# Patient Record
Sex: FEMALE | Race: WHITE | Employment: FULL TIME | URBAN - METROPOLITAN AREA
[De-identification: names, ages, dates, MRNs, and addresses within clinical notes are randomized per-mention and may not be internally consistent; named-entity substitution may affect disease eponyms.]

---

## 2017-05-16 ENCOUNTER — OFFICE VISIT (OUTPATIENT)
Dept: ORTHOPEDICS CLINIC | Facility: CLINIC | Age: 57
End: 2017-05-16

## 2017-05-16 VITALS — RESPIRATION RATE: 14 BRPM | HEART RATE: 66 BPM | SYSTOLIC BLOOD PRESSURE: 118 MMHG | DIASTOLIC BLOOD PRESSURE: 62 MMHG

## 2017-05-16 DIAGNOSIS — M17.0 BILATERAL PRIMARY OSTEOARTHRITIS OF KNEE: Primary | ICD-10-CM

## 2017-05-16 PROCEDURE — 20610 DRAIN/INJ JOINT/BURSA W/O US: CPT | Performed by: ORTHOPAEDIC SURGERY

## 2017-05-16 NOTE — PROCEDURES
After the patient's informed consent was obtained, an informational brochure was given to the patient. The patient's left knee was sterilely prepped and 20 mg Kenalog and 4 cc of 1% lidocaine was given into the knee. Patient tolerated the procedure well.

## 2017-05-16 NOTE — PROGRESS NOTES
Per verbal order from D, draw up 4ml of 1% lidocaine and 2ml of Kenalog 10 for cortisone injection to bilateral knee [facility]

## 2018-01-19 ENCOUNTER — OFFICE VISIT (OUTPATIENT)
Dept: ORTHOPEDICS CLINIC | Facility: CLINIC | Age: 58
End: 2018-01-19

## 2018-01-19 VITALS — DIASTOLIC BLOOD PRESSURE: 78 MMHG | RESPIRATION RATE: 16 BRPM | SYSTOLIC BLOOD PRESSURE: 126 MMHG | HEART RATE: 76 BPM

## 2018-01-19 DIAGNOSIS — M17.0 BILATERAL PRIMARY OSTEOARTHRITIS OF KNEE: Primary | ICD-10-CM

## 2018-01-19 PROCEDURE — 99212 OFFICE O/P EST SF 10 MIN: CPT | Performed by: ORTHOPAEDIC SURGERY

## 2018-01-19 PROCEDURE — 99213 OFFICE O/P EST LOW 20 MIN: CPT | Performed by: ORTHOPAEDIC SURGERY

## 2018-01-19 PROCEDURE — 20610 DRAIN/INJ JOINT/BURSA W/O US: CPT | Performed by: ORTHOPAEDIC SURGERY

## 2018-01-19 NOTE — PROGRESS NOTES
This is a pleasant 28-year-old female with history of bilateral knee osteoarthritis. Patient reports that she recently began to have pain in the bilateral knees. The patient's previous injections were given in May 2017.   The patient comes in today for ev

## 2018-01-19 NOTE — PROGRESS NOTES
Per verbal order from [facility], draw up 4ml of 1% lidocaine and 2ml of Kenalog 10 for cortisone injection to bilateral knee.  Amanda Queen

## 2018-08-24 ENCOUNTER — OFFICE VISIT (OUTPATIENT)
Dept: ORTHOPEDICS CLINIC | Facility: CLINIC | Age: 58
End: 2018-08-24
Payer: COMMERCIAL

## 2018-08-24 VITALS — SYSTOLIC BLOOD PRESSURE: 132 MMHG | DIASTOLIC BLOOD PRESSURE: 84 MMHG | HEART RATE: 92 BPM | RESPIRATION RATE: 16 BRPM

## 2018-08-24 DIAGNOSIS — M17.0 BILATERAL PRIMARY OSTEOARTHRITIS OF KNEE: Primary | ICD-10-CM

## 2018-08-24 PROCEDURE — 99213 OFFICE O/P EST LOW 20 MIN: CPT | Performed by: ORTHOPAEDIC SURGERY

## 2018-08-24 PROCEDURE — 20610 DRAIN/INJ JOINT/BURSA W/O US: CPT | Performed by: ORTHOPAEDIC SURGERY

## 2018-08-24 PROCEDURE — 99212 OFFICE O/P EST SF 10 MIN: CPT | Performed by: ORTHOPAEDIC SURGERY

## 2018-08-24 NOTE — PROGRESS NOTES
Per verbal order from [facility], draw up 4ml of 1% lidocaine and 2ml of Kenalog 10 for cortisone injection to bilateral knee. Amanda Queen    Patient left before post injection vitals were taken.  Amanda Queen

## 2018-08-24 NOTE — PROGRESS NOTES
This is a pleasant 69-year-old female with previous diagnosis of bilateral knee osteoarthritis that is symptomatic. She last had cortisone injections in January 2018. She comes in today for bilateral knee cortisone injections.   Patient reports her pain r

## 2019-01-18 ENCOUNTER — OFFICE VISIT (OUTPATIENT)
Dept: ORTHOPEDICS CLINIC | Facility: CLINIC | Age: 59
End: 2019-01-18
Payer: COMMERCIAL

## 2019-01-18 VITALS — SYSTOLIC BLOOD PRESSURE: 128 MMHG | HEART RATE: 85 BPM | RESPIRATION RATE: 18 BRPM | DIASTOLIC BLOOD PRESSURE: 77 MMHG

## 2019-01-18 DIAGNOSIS — M17.0 BILATERAL PRIMARY OSTEOARTHRITIS OF KNEE: Primary | ICD-10-CM

## 2019-01-18 PROCEDURE — 99212 OFFICE O/P EST SF 10 MIN: CPT | Performed by: ORTHOPAEDIC SURGERY

## 2019-01-18 PROCEDURE — 20610 DRAIN/INJ JOINT/BURSA W/O US: CPT | Performed by: ORTHOPAEDIC SURGERY

## 2019-01-18 PROCEDURE — 99213 OFFICE O/P EST LOW 20 MIN: CPT | Performed by: ORTHOPAEDIC SURGERY

## 2019-01-18 NOTE — PROGRESS NOTES
This is a pleasant 15-year-old female that comes in today for repeat evaluation of the bilateral knees. She has a previous diagnosis of bilateral knee osteoarthritis. She last received cortisone injections in August 2018.   The patient reports her pain re

## 2019-01-18 NOTE — PROGRESS NOTES
Per verbal order from [facility], draw up 4ml of 1% lidocaine and 2ml of Kenalog 10 for cortisone injection to bilateral knee.  Amanda Queen

## 2019-07-12 ENCOUNTER — OFFICE VISIT (OUTPATIENT)
Dept: ORTHOPEDICS CLINIC | Facility: CLINIC | Age: 59
End: 2019-07-12
Payer: COMMERCIAL

## 2019-07-12 VITALS — SYSTOLIC BLOOD PRESSURE: 127 MMHG | DIASTOLIC BLOOD PRESSURE: 78 MMHG | HEART RATE: 78 BPM | RESPIRATION RATE: 18 BRPM

## 2019-07-12 DIAGNOSIS — M17.0 BILATERAL PRIMARY OSTEOARTHRITIS OF KNEE: Primary | ICD-10-CM

## 2019-07-12 PROCEDURE — 20610 DRAIN/INJ JOINT/BURSA W/O US: CPT | Performed by: ORTHOPAEDIC SURGERY

## 2019-07-12 PROCEDURE — 99213 OFFICE O/P EST LOW 20 MIN: CPT | Performed by: ORTHOPAEDIC SURGERY

## 2019-07-12 NOTE — PROGRESS NOTES
Per verbal order from [facility], draw up 4ml of 1% lidocaine and 2ml of Kenalog 10 for cortisone injection to bilateral knee.  Amanda Queen

## 2019-07-12 NOTE — PROCEDURES
After sterile prep, a mixture of 20 mg Kenalog and 4 cc 1% lidocaine was injected into the right and left knee. Patient tolerated each procedure well.

## 2019-07-12 NOTE — PROGRESS NOTES
This is a pleasant 24-year-old female with a previous diagnosis of bilateral knee osteoarthritis that is symptomatically. Patient last came into the office in January 2019 to receive her cortisone injections.   She comes in today for repeat evaluation toda

## 2022-07-27 ENCOUNTER — EKG ENCOUNTER (OUTPATIENT)
Dept: LAB | Facility: HOSPITAL | Age: 62
End: 2022-07-27
Attending: FAMILY MEDICINE
Payer: COMMERCIAL

## 2022-07-27 DIAGNOSIS — M17.9 OSTEOARTHRITIS OF KNEE, UNSPECIFIED: ICD-10-CM

## 2022-07-27 DIAGNOSIS — Z01.818 PRE-OP TESTING: ICD-10-CM

## 2022-07-27 DIAGNOSIS — Z01.818 PREOP EXAMINATION: Primary | ICD-10-CM

## 2022-07-27 PROCEDURE — 93005 ELECTROCARDIOGRAM TRACING: CPT

## 2022-07-27 PROCEDURE — 93010 ELECTROCARDIOGRAM REPORT: CPT | Performed by: FAMILY MEDICINE

## 2022-08-01 ENCOUNTER — LAB ENCOUNTER (OUTPATIENT)
Dept: LAB | Facility: HOSPITAL | Age: 62
End: 2022-08-01
Attending: ORTHOPAEDIC SURGERY
Payer: COMMERCIAL

## 2022-08-01 DIAGNOSIS — Z01.818 PREOP TESTING: ICD-10-CM

## 2022-08-01 LAB
ANTIBODY SCREEN: NEGATIVE
RH BLOOD TYPE: POSITIVE
RH BLOOD TYPE: POSITIVE
SARS-COV-2 RNA RESP QL NAA+PROBE: NOT DETECTED

## 2022-08-01 PROCEDURE — 86900 BLOOD TYPING SEROLOGIC ABO: CPT

## 2022-08-01 PROCEDURE — 36415 COLL VENOUS BLD VENIPUNCTURE: CPT

## 2022-08-01 PROCEDURE — 86850 RBC ANTIBODY SCREEN: CPT

## 2022-08-01 PROCEDURE — 86901 BLOOD TYPING SEROLOGIC RH(D): CPT

## 2022-08-03 ENCOUNTER — ANESTHESIA (OUTPATIENT)
Dept: SURGERY | Facility: HOSPITAL | Age: 62
End: 2022-08-03
Payer: COMMERCIAL

## 2022-08-03 ENCOUNTER — APPOINTMENT (OUTPATIENT)
Dept: GENERAL RADIOLOGY | Facility: HOSPITAL | Age: 62
End: 2022-08-03
Attending: PHYSICIAN ASSISTANT
Payer: COMMERCIAL

## 2022-08-03 ENCOUNTER — HOSPITAL ENCOUNTER (OUTPATIENT)
Facility: HOSPITAL | Age: 62
Setting detail: HOSPITAL OUTPATIENT SURGERY
Discharge: HOME HEALTH | End: 2022-08-03
Attending: ORTHOPAEDIC SURGERY | Admitting: ORTHOPAEDIC SURGERY
Payer: COMMERCIAL

## 2022-08-03 ENCOUNTER — ANESTHESIA EVENT (OUTPATIENT)
Dept: SURGERY | Facility: HOSPITAL | Age: 62
End: 2022-08-03
Payer: COMMERCIAL

## 2022-08-03 VITALS
DIASTOLIC BLOOD PRESSURE: 68 MMHG | HEART RATE: 74 BPM | TEMPERATURE: 98 F | SYSTOLIC BLOOD PRESSURE: 134 MMHG | HEIGHT: 61 IN | RESPIRATION RATE: 19 BRPM | OXYGEN SATURATION: 100 % | BODY MASS INDEX: 23.98 KG/M2 | WEIGHT: 127 LBS

## 2022-08-03 DIAGNOSIS — Z01.818 PREOP TESTING: Primary | ICD-10-CM

## 2022-08-03 PROCEDURE — 88305 TISSUE EXAM BY PATHOLOGIST: CPT | Performed by: ORTHOPAEDIC SURGERY

## 2022-08-03 PROCEDURE — 64447 NJX AA&/STRD FEMORAL NRV IMG: CPT | Performed by: ORTHOPAEDIC SURGERY

## 2022-08-03 PROCEDURE — 0SRD0J9 REPLACEMENT OF LEFT KNEE JOINT WITH SYNTHETIC SUBSTITUTE, CEMENTED, OPEN APPROACH: ICD-10-PCS | Performed by: ORTHOPAEDIC SURGERY

## 2022-08-03 PROCEDURE — 76942 ECHO GUIDE FOR BIOPSY: CPT | Performed by: ORTHOPAEDIC SURGERY

## 2022-08-03 PROCEDURE — 97116 GAIT TRAINING THERAPY: CPT

## 2022-08-03 PROCEDURE — 88311 DECALCIFY TISSUE: CPT | Performed by: ORTHOPAEDIC SURGERY

## 2022-08-03 PROCEDURE — 97161 PT EVAL LOW COMPLEX 20 MIN: CPT

## 2022-08-03 PROCEDURE — 73560 X-RAY EXAM OF KNEE 1 OR 2: CPT | Performed by: PHYSICIAN ASSISTANT

## 2022-08-03 DEVICE — IMPLANTABLE DEVICE
Type: IMPLANTABLE DEVICE | Site: KNEE | Status: FUNCTIONAL
Brand: PERSONA®

## 2022-08-03 DEVICE — IMPLANTABLE DEVICE
Type: IMPLANTABLE DEVICE | Site: KNEE | Status: FUNCTIONAL
Brand: BIOMET® BONE CEMENT R

## 2022-08-03 DEVICE — IMPLSYS 2NDRY FIXATN BIOSWVLK 4.75X19.1
Type: IMPLANTABLE DEVICE | Site: KNEE | Status: FUNCTIONAL
Brand: ARTHREX®

## 2022-08-03 DEVICE — IMPLANTABLE DEVICE
Type: IMPLANTABLE DEVICE | Site: KNEE | Status: FUNCTIONAL
Brand: PERSONA™

## 2022-08-03 DEVICE — IMPLANTABLE DEVICE
Type: IMPLANTABLE DEVICE | Site: KNEE | Status: FUNCTIONAL
Brand: PERSONA® VIVACIT-E®

## 2022-08-03 DEVICE — IMPLANTABLE DEVICE
Type: IMPLANTABLE DEVICE | Site: KNEE | Status: FUNCTIONAL
Brand: PERSONA® NATURAL TIBIA®

## 2022-08-03 RX ORDER — NALOXONE HYDROCHLORIDE 0.4 MG/ML
80 INJECTION, SOLUTION INTRAMUSCULAR; INTRAVENOUS; SUBCUTANEOUS AS NEEDED
Status: DISCONTINUED | OUTPATIENT
Start: 2022-08-03 | End: 2022-08-03

## 2022-08-03 RX ORDER — HYDROMORPHONE HYDROCHLORIDE 1 MG/ML
0.2 INJECTION, SOLUTION INTRAMUSCULAR; INTRAVENOUS; SUBCUTANEOUS EVERY 5 MIN PRN
Status: DISCONTINUED | OUTPATIENT
Start: 2022-08-03 | End: 2022-08-03

## 2022-08-03 RX ORDER — MORPHINE SULFATE 10 MG/ML
6 INJECTION, SOLUTION INTRAMUSCULAR; INTRAVENOUS EVERY 10 MIN PRN
Status: DISCONTINUED | OUTPATIENT
Start: 2022-08-03 | End: 2022-08-03

## 2022-08-03 RX ORDER — SODIUM CHLORIDE, SODIUM LACTATE, POTASSIUM CHLORIDE, CALCIUM CHLORIDE 600; 310; 30; 20 MG/100ML; MG/100ML; MG/100ML; MG/100ML
INJECTION, SOLUTION INTRAVENOUS CONTINUOUS
Status: DISCONTINUED | OUTPATIENT
Start: 2022-08-03 | End: 2022-08-03

## 2022-08-03 RX ORDER — OXYCODONE HYDROCHLORIDE 5 MG/1
5 TABLET ORAL EVERY 6 HOURS PRN
Qty: 28 TABLET | Refills: 0 | Status: SHIPPED | COMMUNITY
Start: 2022-08-03

## 2022-08-03 RX ORDER — ONDANSETRON 2 MG/ML
4 INJECTION INTRAMUSCULAR; INTRAVENOUS EVERY 6 HOURS PRN
Status: CANCELLED | OUTPATIENT
Start: 2022-08-03

## 2022-08-03 RX ORDER — ONDANSETRON 2 MG/ML
4 INJECTION INTRAMUSCULAR; INTRAVENOUS EVERY 6 HOURS PRN
Status: DISCONTINUED | OUTPATIENT
Start: 2022-08-03 | End: 2022-08-03

## 2022-08-03 RX ORDER — CLINDAMYCIN PHOSPHATE 900 MG/50ML
900 INJECTION INTRAVENOUS ONCE
Status: DISCONTINUED | OUTPATIENT
Start: 2022-08-03 | End: 2022-08-03

## 2022-08-03 RX ORDER — BUPIVACAINE HYDROCHLORIDE 7.5 MG/ML
INJECTION, SOLUTION INTRASPINAL
Status: COMPLETED | OUTPATIENT
Start: 2022-08-03 | End: 2022-08-03

## 2022-08-03 RX ORDER — ASPIRIN 81 MG/1
81 TABLET ORAL 2 TIMES DAILY
Qty: 60 TABLET | Refills: 0 | Status: SHIPPED | COMMUNITY
Start: 2022-08-03

## 2022-08-03 RX ORDER — HYDROMORPHONE HYDROCHLORIDE 1 MG/ML
0.6 INJECTION, SOLUTION INTRAMUSCULAR; INTRAVENOUS; SUBCUTANEOUS EVERY 5 MIN PRN
Status: DISCONTINUED | OUTPATIENT
Start: 2022-08-03 | End: 2022-08-03

## 2022-08-03 RX ORDER — DEXAMETHASONE SODIUM PHOSPHATE 4 MG/ML
VIAL (ML) INJECTION AS NEEDED
Status: DISCONTINUED | OUTPATIENT
Start: 2022-08-03 | End: 2022-08-03 | Stop reason: SURG

## 2022-08-03 RX ORDER — HYDROMORPHONE HYDROCHLORIDE 1 MG/ML
0.4 INJECTION, SOLUTION INTRAMUSCULAR; INTRAVENOUS; SUBCUTANEOUS EVERY 5 MIN PRN
Status: DISCONTINUED | OUTPATIENT
Start: 2022-08-03 | End: 2022-08-03

## 2022-08-03 RX ORDER — MORPHINE SULFATE 4 MG/ML
2 INJECTION, SOLUTION INTRAMUSCULAR; INTRAVENOUS EVERY 10 MIN PRN
Status: DISCONTINUED | OUTPATIENT
Start: 2022-08-03 | End: 2022-08-03

## 2022-08-03 RX ORDER — ACETAMINOPHEN 500 MG
1000 TABLET ORAL EVERY 8 HOURS
Qty: 60 TABLET | Refills: 0 | Status: SHIPPED | COMMUNITY
Start: 2022-08-03

## 2022-08-03 RX ORDER — CELECOXIB 200 MG/1
200 CAPSULE ORAL DAILY
Qty: 30 CAPSULE | Refills: 0 | Status: SHIPPED | OUTPATIENT
Start: 2022-08-03

## 2022-08-03 RX ORDER — MIDAZOLAM HYDROCHLORIDE 1 MG/ML
INJECTION INTRAMUSCULAR; INTRAVENOUS
Status: COMPLETED | OUTPATIENT
Start: 2022-08-03 | End: 2022-08-03

## 2022-08-03 RX ORDER — MORPHINE SULFATE 4 MG/ML
4 INJECTION, SOLUTION INTRAMUSCULAR; INTRAVENOUS EVERY 10 MIN PRN
Status: DISCONTINUED | OUTPATIENT
Start: 2022-08-03 | End: 2022-08-03

## 2022-08-03 RX ORDER — ROPIVACAINE HYDROCHLORIDE 5 MG/ML
INJECTION, SOLUTION EPIDURAL; INFILTRATION; PERINEURAL
Status: COMPLETED | OUTPATIENT
Start: 2022-08-03 | End: 2022-08-03

## 2022-08-03 RX ORDER — LIDOCAINE HYDROCHLORIDE 10 MG/ML
INJECTION, SOLUTION INFILTRATION; PERINEURAL
Status: COMPLETED | OUTPATIENT
Start: 2022-08-03 | End: 2022-08-03

## 2022-08-03 RX ORDER — LIDOCAINE HYDROCHLORIDE 10 MG/ML
INJECTION, SOLUTION EPIDURAL; INFILTRATION; INTRACAUDAL; PERINEURAL AS NEEDED
Status: DISCONTINUED | OUTPATIENT
Start: 2022-08-03 | End: 2022-08-03 | Stop reason: SURG

## 2022-08-03 RX ORDER — ONDANSETRON 2 MG/ML
INJECTION INTRAMUSCULAR; INTRAVENOUS AS NEEDED
Status: DISCONTINUED | OUTPATIENT
Start: 2022-08-03 | End: 2022-08-03 | Stop reason: SURG

## 2022-08-03 RX ORDER — PROCHLORPERAZINE EDISYLATE 5 MG/ML
5 INJECTION INTRAMUSCULAR; INTRAVENOUS EVERY 8 HOURS PRN
Status: DISCONTINUED | OUTPATIENT
Start: 2022-08-03 | End: 2022-08-03

## 2022-08-03 RX ORDER — ACETAMINOPHEN 500 MG
1000 TABLET ORAL ONCE
Status: COMPLETED | OUTPATIENT
Start: 2022-08-03 | End: 2022-08-03

## 2022-08-03 RX ORDER — DEXAMETHASONE SODIUM PHOSPHATE 10 MG/ML
INJECTION, SOLUTION INTRAMUSCULAR; INTRAVENOUS
Status: COMPLETED | OUTPATIENT
Start: 2022-08-03 | End: 2022-08-03

## 2022-08-03 RX ORDER — TRANEXAMIC ACID 10 MG/ML
INJECTION, SOLUTION INTRAVENOUS AS NEEDED
Status: DISCONTINUED | OUTPATIENT
Start: 2022-08-03 | End: 2022-08-03 | Stop reason: SURG

## 2022-08-03 RX ADMIN — SODIUM CHLORIDE, SODIUM LACTATE, POTASSIUM CHLORIDE, CALCIUM CHLORIDE: 600; 310; 30; 20 INJECTION, SOLUTION INTRAVENOUS at 09:45:00

## 2022-08-03 RX ADMIN — DEXAMETHASONE SODIUM PHOSPHATE 4 MG: 4 MG/ML VIAL (ML) INJECTION at 07:54:00

## 2022-08-03 RX ADMIN — TRANEXAMIC ACID 1000 MG: 10 INJECTION, SOLUTION INTRAVENOUS at 07:44:00

## 2022-08-03 RX ADMIN — BUPIVACAINE HYDROCHLORIDE 1.5 ML: 7.5 INJECTION, SOLUTION INTRASPINAL at 07:37:00

## 2022-08-03 RX ADMIN — LIDOCAINE HYDROCHLORIDE 50 MG: 10 INJECTION, SOLUTION EPIDURAL; INFILTRATION; INTRACAUDAL; PERINEURAL at 07:44:00

## 2022-08-03 RX ADMIN — LIDOCAINE HYDROCHLORIDE 1 ML: 10 INJECTION, SOLUTION INFILTRATION; PERINEURAL at 07:37:00

## 2022-08-03 RX ADMIN — ONDANSETRON 4 MG: 2 INJECTION INTRAMUSCULAR; INTRAVENOUS at 07:54:00

## 2022-08-03 RX ADMIN — DEXAMETHASONE SODIUM PHOSPHATE 10 MG: 10 INJECTION, SOLUTION INTRAMUSCULAR; INTRAVENOUS at 07:26:00

## 2022-08-03 RX ADMIN — ROPIVACAINE HYDROCHLORIDE 30 ML: 5 INJECTION, SOLUTION EPIDURAL; INFILTRATION; PERINEURAL at 07:26:00

## 2022-08-03 RX ADMIN — TRANEXAMIC ACID 1000 MG: 10 INJECTION, SOLUTION INTRAVENOUS at 09:44:00

## 2022-08-03 RX ADMIN — MIDAZOLAM HYDROCHLORIDE 2 MG: 1 INJECTION INTRAMUSCULAR; INTRAVENOUS at 07:26:00

## 2022-08-03 NOTE — BRIEF OP NOTE
Pre-Operative Diagnosis: primary osteoarthritis of left knee     Post-Operative Diagnosis: primary osteoarthritis of left knee      Procedure Performed:   left total knee arthroplasty    Surgeon(s) and Role:     Dorota Ledesma MD - Primary    Assistant(s):  Surgical Assistant.: Reinaldo Cloud  PA: Abiodun Morales PA-C     Surgical Findings:left knee arthritis     Specimen: left knee bone cuts and tissue     Estimated Blood Loss: Blood Output: 10 mL (8/3/2022  9:50 AM)    Dictation Number:  done    Trent Nagy PA-C  8/3/2022  9:56 AM

## 2022-08-03 NOTE — ANESTHESIA PROCEDURE NOTES
Spinal Block    Date/Time: 8/3/2022 7:37 AM  Performed by: Enriqueta Luz CRNA  Authorized by: Garrett Lawson MD       General Information and Staff    Start Time:  8/3/2022 7:33 AM  End Time:  8/3/2022 7:38 AM  Anesthesiologist:  Garrett Lawson MD  CRNA:  Enriqueta Luz CRNA  Performed by:  CRNA  Preanesthetic Checklist: patient identified, IV checked, risks and benefits discussed, monitors and equipment checked, pre-op evaluation, timeout performed, anesthesia consent and sterile technique used      Procedure Details    Patient Position:  Sitting  Prep: ChloraPrep and patient draped    Monitoring:  Cardiac monitor and continuous pulse ox  Approach:  Midline  Location:  L3-4  Injection Technique:  Single-shot    Needle    Needle Type:  Pencil-tip  Needle Gauge:  24 G  Needle Length:  3.5 in    Assessment    Sensory Level:  T10  Events: clear CSF, CSF aspirated, well tolerated and blood negative      Additional Comments

## 2022-08-03 NOTE — INTERVAL H&P NOTE
Pre-op Diagnosis: primary osteoarthritis of left knee    The above referenced H&P was reviewed by Arianna Parekh MD on 8/3/2022, the patient was examined and no significant changes have occurred in the patient's condition since the H&P was performed. I discussed with the patient and/or legal representative the potential benefits, risks and side effects of this procedure; the likelihood of the patient achieving goals; and potential problems that might occur during recuperation. I discussed reasonable alternatives to the procedure, including risks, benefits and side effects related to the alternatives and risks related to not receiving this procedure. We will proceed with procedure as planned.     Update to H&P: Procedure going to be done at [facility]

## 2022-08-03 NOTE — ANESTHESIA PROCEDURE NOTES
Peripheral Block    Date/Time: 8/3/2022 7:26 AM  Performed by: Janett Culver CRNA  Authorized by: Gerald Han MD       General Information and Staff    Start Time:  8/3/2022 7:18 AM  End Time:  8/3/2022 7:23 AM  CRNA:  Janett Culver CRNA  Performed by:  CRNA  Patient Location:  PACU    Block Placement: Pre Induction  Site Identification: real time ultrasound guided and image stored and retrievable      Reason for Block: at surgeon's request and post-op pain management    Preanesthetic Checklist: 2 patient identifers, IV checked, risks and benefits discussed, monitors and equipment checked, pre-op evaluation, timeout performed, anesthesia consent and sterile technique used      Procedure Details    Patient Position:  Supine  Prep: ChloraPrep    Monitoring:  Cardiac monitor and continuous pulse ox  Block Type:  Saphenous  Injection Technique:  Single-shot    Needle    Needle Type:   Needle Gauge: 22 G  Needle Length:  50 mm  Reason for Ultrasound Use: appropriate spread of the medication was noted in real time and no ultrasound evidence of intravascular and/or intraneural injection            Assessment    Injection Assessment:  Good spread noted, incremental injection, low pressure, local visualized surrounding nerve on ultrasound, negative aspiration for heme and no pain on injection  Paresthesia Pain:  None  Heart Rate Change: No        Medications  8/3/2022 7:26 AM  Midazolam (VERSED)injection 2mg/2ml, 2 mg  fentaNYL (SUBLIMAZE) injection 50mcg/ml, 100 mcg  dexamethasone (DECADRON) PF injection 10 mg/ml, 10 mg  ropivacaine (NAROPIN) injection 0.5%, 30 mL    Additional Comments

## 2022-08-03 NOTE — OPERATIVE REPORT
[facility]    PATIENT'S NAME: Angela Ohara   ATTENDING PHYSICIAN: Mary Ann Gabriel MD   OPERATING PHYSICIAN: Mary Ann Gabriel MD   PATIENT ACCOUNT#:   [de-identified]    LOCATION:  [address]  MEDICAL RECORD #:   [id number]       YOB: 1960  ADMISSION DATE:       08/03/2022      OPERATION DATE:  08/03/2022    OPERATIVE REPORT      PREOPERATIVE DIAGNOSIS:  Left knee osteoarthritis. POSTOPERATIVE DIAGNOSIS:  Left knee osteoarthritis. PROCEDURE:  Left total knee arthroplasty. ASSISTANT:    1.   Julian Barrera PA-C. Please note, Ms. Yazmin Fierro PA-C, was essential to the case for preoperative patient positioning and draping, assistance with soft tissue retraction throughout the case, as well as closure of the wound and placement of the dressing. 2.   WILIAN Birch. IMPLANTS USED:  Nichol Persona size 5 standard femur, size D tibia, 26 mm polyethylene patellar button, and 12 mm constrained polyethylene insert. INDICATIONS:  This is a pleasant 22-year-old female who sustained left knee osteoarthritis. She has undergone a long course of nonoperative intervention which has failed to provide significant relief for her pain. She desired surgery. I had a long discussion with the patient regarding the risks, benefits, and alternatives of surgical and nonsurgical intervention. The risks of surgery included, but were not limited to, infection, bleeding, neurovascular injury, pain and stiffness after surgery, need for surgery in the future, development of deep vein thrombosis or pulmonary emboli, and anesthetic compromise. Patient agreed to proceed with surgical intervention after the above risks were discussed in great detail. OPERATIVE TECHNIQUE:  After the patient's informed consent was obtained, the patient's left knee was marked in the preoperative holding area.   She underwent an adductor canal block and then underwent a spinal anesthetic by the anesthesia service. She was brought back to the operating room and sedated, and then she was given the spinal anesthetic. The left lower extremity was prepped and draped in standard surgical fashion. Perioperative antibiotics were infused. Confirmation of identity and laterality took place. Time-out was performed. A well-padded tourniquet was placed on the left proximal thigh prior to prepping and draping the lower extremity. After a time-out was performed, perioperative antibiotics were infused, confirmation of identity and laterality took place. A midline incision was made over the left knee. Medial parapatellar arthrotomy was performed. There were significant osteophytes present. A medial release was performed. Fat pad was excised. Suprapatellar pouch was excised. There was significant scarring along the medial aspect of the knee due to her prior subtotal meniscectomy surgery remotely. Knee was flexed. Distal femur was cut. The 4-in-1 cutting guide was placed on the distal femur and anterior and posterior cuts were made followed by anterior, posterior, and chamfer cuts. The tibia was subluxated forward, pinned and cut in the appropriate position. Looking at the patellar tendon attachment on the tibial tubercle, some of the patellar attachment medially was released to increase visualization. At this point excess lateral meniscus was excised from the wound. A 10 mm block was placed and the patient had relative stability of varus and valgus stress testing at 0 and 30 degrees; however, due to her significant varus malalignment prior to surgery, constrained polyethylene was selected. At this time the proximal tibia was pinned and cut to accept the final tibia. A trial component was placed. The distal femoral component was placed. The box was cut and 10 mm polyethylene insert was placed. The patient again had relative stability to varus and valgus stress testing.   At this point the wound was thoroughly irrigated. The patella was everted carefully and cut to accept a size 26 mm implant. The posterior, medial, and lateral capsule was then infiltrated with the pain cocktail. The trial components were taken out. The cement was mixed. Final components were placed with the proximal tibial component first followed by the distal femoral component. A 10 mm polyethylene insert was placed. The patella was everted and the 26 mm polyethylene button was cemented into place. At this time excess cement was removed, cement cured, and a 12 mm polyethylene constrained insert was then selected, placed into the knee, and the patient had stability to varus and valgus stress testing, had full range of motion. At this time the wound was thoroughly irrigated and #2 FiberWire was used in a modified Krackow position at the medial aspect of the patellar tendon. This was incorporated to a 4.75 mm SwiveLock anchor distally to reattach this aspect of the patellar tendon to the tibial tubercle. The wound was thoroughly irrigated once more. A #1 Vicryl suture was used to close medial parapatellar arthrotomy. A 2-0 Vicryl suture was used to close skin. Staples were applied to the wound. Dressing was applied over the knee. The patient was awakened from anesthesia successfully and transferred to the recovery room in stable condition. DISPOSITION:  The patient is weightbearing as tolerated on the left lower extremity. She will go home today. She will use OxyContin and Tylenol for pain control. She will use aspirin for DVT prophylaxis. She will follow up in our office in 2 weeks for her first postoperative evaluation.      Dictated By Julee Vaughn MD  d: 08/03/2022 09:48:32  t: 08/03/2022 11:51:22  [facility] [id number]  /

## 2022-08-03 NOTE — CM/SW NOTE
JEANETTE has been notified the pt. Has been pre-arranged with [facility]. Referral sent in A[facility] with face to face and Meri Peacock orders. JEANETTE notified [facility] of discharge plan from same day surgery.      [phone]

## 2024-02-28 RX ORDER — DORZOLAMIDE HYDROCHLORIDE AND TIMOLOL MALEATE 20; 5 MG/ML; MG/ML
1 SOLUTION/ DROPS OPHTHALMIC 2 TIMES DAILY
COMMUNITY

## 2024-02-28 RX ORDER — LEVOTHYROXINE SODIUM 0.07 MG/1
75 TABLET ORAL
COMMUNITY

## 2024-03-01 ENCOUNTER — LAB ENCOUNTER (OUTPATIENT)
Dept: LAB | Facility: HOSPITAL | Age: 64
End: 2024-03-01
Attending: ORTHOPAEDIC SURGERY
Payer: COMMERCIAL

## 2024-03-01 DIAGNOSIS — Z01.818 PREOP TESTING: ICD-10-CM

## 2024-03-01 LAB — MRSA DNA SPEC QL NAA+PROBE: POSITIVE

## 2024-03-01 PROCEDURE — 87641 MR-STAPH DNA AMP PROBE: CPT

## 2024-03-01 NOTE — CM/SW NOTE
3/6 351pm:  [facility] referral, order and face to face have been sent to [facility] via [facility].      Plan: Home with [facility] once [facility] confirms acceptance.   -------------------------  3/1 336pm:  was notified the pt. Has been pre-arranged with [facility].    Referral, orders and face to face to be sent post op.     SHERI Fuller ext [phone]

## 2024-03-04 NOTE — DISCHARGE INSTRUCTIONS
Home Care Instructions Following Your Knee Replacement     We hope you were pleased with your care at [facility]. We wish you the best outcome and overall experience with your operation.   These instructions will help to minimize your pain and improve the likelihood of a successful result.     Weight bearing status  You may place as much weight as tolerated on your operative leg  Use a walker or a cane for assistance with ambulation    Bandages (Dressing)  Keep dressing clean  You can shower as long as your dressing is well sealed beginning 2 days after surgery    Wound Care  The following instructions will promote proper healing and help to prevent infection.  Leave your dressing in place until 7 days after your surgery  On post-operative day 7, you or your home health nurse can remove the bandage and replace it with another water resistant bandage.  Your incision should remain covered and dry until you are seen for your first post operative visit    Cold Therapy  Cold therapy will help minimize swelling, improve, comfort, and limit the need for pain medication.  Apply an ice pack once an hour for 20 minutes as needed  The ice bag should never come in direct contact with the skin. Always place a towel in between the ice pack and your skin  Immediately contact Dr. Bunch's office if you experience excruciating pain not helped by pain medication, burning, blisters, redness, discoloration, or other changes in skin appearance.    Pain Regimen  Take 1000mg of tylenol every 8 hours  Take 1 tablet of Naproxen 500mg twice daily for 30 days  Take 1-2 tablets of Oxycodone 5mg every 6 hours as needed for pain.     Deep Vein Thrombosis (DVT) Prophylaxis  You are at increased risk for developing a DVT or lower extremity blood clot after a lower extremity surgery.   Signs and symptoms of a DVT include calf swelling, calf pain, and calf tenderness to palpation.  If you experience severe calf pain, calf tenderness or  calf swelling, you should go to the nearest urgent care or emergency room immediately for a STAT ultrasound to assess for a blood clot.  Keys to prevention of  DVT are performing ankle pumps (moving your ankle in an up and down motion) multiple times throughout the day for the first 1-2 weeks post-op.  Take 1 tablet of baby aspirin (81mg) twice daily for 4 weeks after your surgery for anticoagulation purposes    Home Medication  Resume your home medications as instructed    Diet  Resume your normal diet.    Activity  Be up and around & working on your PT exercises as you can tolerate.   Try getting up and walking or doing your PT exercises once every hour while awake    Driving  Do not drive if you are taking pain medication. Do not drive until after you are seen in the office.    Follow-up Appointment with Dr. Bunch  You were likely given a postoperative appointment with Dr. Bunch or Sandra STRICKLAND at the time your operation was scheduled.  Call Dr. Bunch's office today to verify your appointment date, time, and location. Call [phone]. You should be seen about 2 weeks after your surgery.  At your 1st postoperative office visit: Your wounds will be evaluated, staples removed, healing assessed and any additional concerns and instructions will be discussed.    Questions or Concerns  Call Dr. Bunch's office if you experience severe pain not controlled by pain medication, swelling, numbness, tingling, bleeding, fever, or other concerns.   If your call is made after office hours, there is always a surgeon covering Dr. Bunch's patients if he is unavailable.    Thank you for coming to [facility] for your operation. The nurses and the anesthesiologist try very hard to make sure you receive the best care possible. Your trust in them is greatly appreciated.    Thanks so much,  Dr. Bunch   HOME INSTRUCTIONS  [facility] HOME CARE INSTRUCTIONS: POST-OP ANESTHESIA  The medication that you received for sedation  or general anesthesia can last up to 24 hours. Your judgment and reflexes may be altered, even if you feel like your normal self.      We Recommend:   Do not drive any motor vehicle or bicycle   Avoid mowing the lawn, playing sports, or working with power tools/applicances (power saws, electric knives or mixers)   That you have someone stay with you on your first night home   Do not drink alcohol or take sleeping pills or tranquilizers   Do not sign legal documents within 24 hours of your procedure   If you had a nerve block for your surgery, take extra care not to put any pressure on your arm or hand for 24 hours    It is normal:  For you to have a sore throat if you had a breathing tube during surgery (while you were asleep!). The sore throat should get better within 48 hours. You can gargle with warm salt water (1/2 tsp in 4 oz warm water) or use a throat lozenge for comfort  To feel muscle aches or soreness especially in the abdomen, chest or neck. The achy feeling should go away in the next 24 hours  To feel weak, sleepy or \"wiped out\". Your should start feeling better in the next 24 hours.   To experience mild discomforts such as sore lip or tongue, headache, cramps, gas pains or a bloated feeling in your abdomen.   To experience mild back pain or soreness for a day or two if you had spinal or epidural anesthesia.   If you had laparoscopic surgery, to feel shoulder pain or discomfort on the day of surgery.   For some patients to have nausea after surgery/anesthesia    If you feel nausea or experience vomiting:   Try to move around less.   Eat less than usual or drink only liquids until the next morning   Nausea should resolve in about 24 hours    If you have a problem when you are at home:    Call your surgeons office   Discharge Instructions: After Your Surgery  You’ve just had surgery. During surgery, you were given medicine called anesthesia to keep you relaxed and free of pain. After surgery, you may have  some pain or nausea. This is common. Here are some tips for feeling better and getting well after surgery.   Going home  Your healthcare provider will show you how to take care of yourself when you go home. They'll also answer your questions. Have an adult family member or friend drive you home. For the first 24 hours after your surgery:   Don't drive or use heavy equipment.  Don't make important decisions or sign legal papers.  Take medicines as directed.  Don't drink alcohol.  Have someone stay with you, if needed. They can watch for problems and help keep you safe.  Be sure to go to all follow-up visits with your healthcare provider. And rest after your surgery for as long as your provider tells you to.   Coping with pain  If you have pain after surgery, pain medicine will help you feel better. Take it as directed, before pain becomes severe. Also, ask your healthcare provider or pharmacist about other ways to control pain. This might be with heat, ice, or relaxation. And follow any other instructions your surgeon or nurse gives you.      Stay on schedule with your medicine.     Tips for taking pain medicine  To get the best relief possible, remember these points:   Pain medicines can upset your stomach. Taking them with a little food may help.  Most pain relievers taken by mouth need at least 20 to 30 minutes to start to work.  Don't wait till your pain becomes severe before you take your medicine. Try to time your medicine so that you can take it before starting an activity. This might be before you get dressed, go for a walk, or sit down for dinner.  Constipation is a common side effect of some pain medicines. Call your healthcare provider before taking any medicines such as laxatives or stool softeners to help ease constipation. Also ask if you should skip any foods. Drinking lots of fluids and eating foods such as fruits and vegetables that are high in fiber can also help. Remember, don't take laxatives  unless your surgeon has prescribed them.  Drinking alcohol and taking pain medicine can cause dizziness and slow your breathing. It can even be deadly. Don't drink alcohol while taking pain medicine.  Pain medicine can make you react more slowly to things. Don't drive or run machinery while taking pain medicine.  Your healthcare provider may tell you to take acetaminophen to help ease your pain. Ask them how much you're supposed to take each day. Acetaminophen or other pain relievers may interact with your prescription medicines or other over-the-counter (OTC) medicines. Some prescription medicines have acetaminophen and other ingredients in them. Using both prescription and OTC acetaminophen for pain can cause you to accidentally overdose. Read the labels on your OTC medicines with care. This will help you to clearly know the list of ingredients, how much to take, and any warnings. It may also help you not take too much acetaminophen. If you have questions or don't understand the information, ask your pharmacist or healthcare provider to explain it to you before you take the OTC medicine.   Managing nausea  Some people have an upset stomach (nausea) after surgery. This is often because of anesthesia, pain, or pain medicine, less movement of food in the stomach, or the stress of surgery. These tips will help you handle nausea and eat healthy foods as you get better. If you were on a special food plan before surgery, ask your healthcare provider if you should follow it while you get better. Check with your provider on how your eating should progress. It may depend on the surgery you had. These general tips may help:   Don't push yourself to eat. Your body will tell you when to eat and how much.  Start off with clear liquids and soup. They're easier to digest.  Next try semi-solid foods as you feel ready. These include mashed potatoes, applesauce, and gelatin.  Slowly move to solid foods. Don’t eat fatty, rich, or  spicy foods at first.  Don't force yourself to have 3 large meals a day. Instead eat smaller amounts more often.  Take pain medicines with a small amount of solid food, such as crackers or toast. This helps prevent nausea.  When to call your healthcare provider  Call your healthcare provider right away if you have any of these:   You still have too much pain, or the pain gets worse, after taking the medicine. The medicine may not be strong enough. Or there may be a complication from the surgery.  You feel too sleepy, dizzy, or groggy. The medicine may be too strong.  Side effects such as nausea or vomiting. Your healthcare provider may advise taking other medicines to .  Skin changes such as rash, itching, or hives. This may mean you have an allergic reaction. Your provider may advise taking other medicines.  The incision looks different (for instance, part of it opens up).  Bleeding or fluid leaking from the incision site, and weren't told to expect that.  Fever of 100.4°F (38°C) or higher, or as directed by your provider.  Call 911  Call 911 right away if you have:   Trouble breathing  Facial swelling    If you have obstructive sleep apnea   You were given anesthesia medicine during surgery to keep you comfortable and free of pain. After surgery, you may have more apnea spells because of this medicine and other medicines you were given. The spells may last longer than normal.    At home:  Keep using the continuous positive airway pressure (CPAP) device when you sleep. Unless your healthcare provider tells you not to, use it when you sleep, day or night. CPAP is a common device used to treat obstructive sleep apnea.  Talk with your provider before taking any pain medicine, muscle relaxants, or sedatives. Your provider will tell you about the possible dangers of taking these medicines.  Contact your provider if your sleeping changes a lot even when taking medicines as directed.  Jose last reviewed this  educational content on 10/1/2021  © 0112-2676 [employer]. All rights reserved. This information is not intended as a substitute for professional medical care. Always follow your healthcare professional's instructions.

## 2024-03-06 ENCOUNTER — HOSPITAL ENCOUNTER (OUTPATIENT)
Facility: HOSPITAL | Age: 64
Discharge: HOME HEALTH | End: 2024-03-07
Attending: ORTHOPAEDIC SURGERY | Admitting: ORTHOPAEDIC SURGERY
Payer: COMMERCIAL

## 2024-03-06 ENCOUNTER — ANESTHESIA (OUTPATIENT)
Dept: SURGERY | Facility: HOSPITAL | Age: 64
End: 2024-03-06
Payer: COMMERCIAL

## 2024-03-06 ENCOUNTER — ANESTHESIA EVENT (OUTPATIENT)
Dept: SURGERY | Facility: HOSPITAL | Age: 64
End: 2024-03-06
Payer: COMMERCIAL

## 2024-03-06 ENCOUNTER — APPOINTMENT (OUTPATIENT)
Dept: GENERAL RADIOLOGY | Facility: HOSPITAL | Age: 64
End: 2024-03-06
Attending: PHYSICIAN ASSISTANT
Payer: COMMERCIAL

## 2024-03-06 DIAGNOSIS — M17.11 PRIMARY OSTEOARTHRITIS OF RIGHT KNEE: ICD-10-CM

## 2024-03-06 DIAGNOSIS — Z01.818 PREOP TESTING: Primary | ICD-10-CM

## 2024-03-06 PROCEDURE — 88311 DECALCIFY TISSUE: CPT | Performed by: ORTHOPAEDIC SURGERY

## 2024-03-06 PROCEDURE — 97116 GAIT TRAINING THERAPY: CPT

## 2024-03-06 PROCEDURE — 73560 X-RAY EXAM OF KNEE 1 OR 2: CPT | Performed by: PHYSICIAN ASSISTANT

## 2024-03-06 PROCEDURE — 88305 TISSUE EXAM BY PATHOLOGIST: CPT | Performed by: ORTHOPAEDIC SURGERY

## 2024-03-06 PROCEDURE — 0SRC0J9 REPLACEMENT OF RIGHT KNEE JOINT WITH SYNTHETIC SUBSTITUTE, CEMENTED, OPEN APPROACH: ICD-10-PCS | Performed by: ORTHOPAEDIC SURGERY

## 2024-03-06 PROCEDURE — 64447 NJX AA&/STRD FEMORAL NRV IMG: CPT | Performed by: ORTHOPAEDIC SURGERY

## 2024-03-06 PROCEDURE — 97530 THERAPEUTIC ACTIVITIES: CPT

## 2024-03-06 PROCEDURE — 97161 PT EVAL LOW COMPLEX 20 MIN: CPT

## 2024-03-06 DEVICE — IMPLANTABLE DEVICE
Type: IMPLANTABLE DEVICE | Site: KNEE | Status: FUNCTIONAL
Brand: PERSONA® VIVACIT-E®

## 2024-03-06 DEVICE — IMPLANTABLE DEVICE
Type: IMPLANTABLE DEVICE | Site: KNEE | Status: FUNCTIONAL
Brand: PERSONA®

## 2024-03-06 DEVICE — IMPLANTABLE DEVICE
Type: IMPLANTABLE DEVICE | Site: KNEE | Status: FUNCTIONAL
Brand: BIOMET® BONE CEMENT R

## 2024-03-06 DEVICE — IMPLANTABLE DEVICE
Type: IMPLANTABLE DEVICE | Site: KNEE | Status: FUNCTIONAL
Brand: PERSONA® NATURAL TIBIA®

## 2024-03-06 RX ORDER — POLYETHYLENE GLYCOL 3350 17 G/17G
17 POWDER, FOR SOLUTION ORAL DAILY PRN
Status: DISCONTINUED | OUTPATIENT
Start: 2024-03-06 | End: 2024-03-06

## 2024-03-06 RX ORDER — DEXAMETHASONE SODIUM PHOSPHATE 10 MG/ML
INJECTION, SOLUTION INTRAMUSCULAR; INTRAVENOUS
Status: COMPLETED | OUTPATIENT
Start: 2024-03-06 | End: 2024-03-06

## 2024-03-06 RX ORDER — DOCUSATE SODIUM 100 MG/1
100 CAPSULE, LIQUID FILLED ORAL 2 TIMES DAILY
Status: DISCONTINUED | OUTPATIENT
Start: 2024-03-06 | End: 2024-03-07

## 2024-03-06 RX ORDER — ONDANSETRON 2 MG/ML
4 INJECTION INTRAMUSCULAR; INTRAVENOUS EVERY 6 HOURS PRN
Status: DISCONTINUED | OUTPATIENT
Start: 2024-03-06 | End: 2024-03-06 | Stop reason: HOSPADM

## 2024-03-06 RX ORDER — ASPIRIN 81 MG/1
81 TABLET ORAL 2 TIMES DAILY
Status: DISCONTINUED | OUTPATIENT
Start: 2024-03-06 | End: 2024-03-07

## 2024-03-06 RX ORDER — HYDROMORPHONE HYDROCHLORIDE 1 MG/ML
0.6 INJECTION, SOLUTION INTRAMUSCULAR; INTRAVENOUS; SUBCUTANEOUS EVERY 5 MIN PRN
Status: DISCONTINUED | OUTPATIENT
Start: 2024-03-06 | End: 2024-03-06 | Stop reason: HOSPADM

## 2024-03-06 RX ORDER — MELATONIN
3 NIGHTLY PRN
Status: DISCONTINUED | OUTPATIENT
Start: 2024-03-06 | End: 2024-03-07

## 2024-03-06 RX ORDER — SODIUM CHLORIDE 9 MG/ML
INJECTION, SOLUTION INTRAVENOUS CONTINUOUS
Status: DISCONTINUED | OUTPATIENT
Start: 2024-03-06 | End: 2024-03-07

## 2024-03-06 RX ORDER — LIDOCAINE HYDROCHLORIDE 10 MG/ML
INJECTION, SOLUTION EPIDURAL; INFILTRATION; INTRACAUDAL; PERINEURAL AS NEEDED
Status: DISCONTINUED | OUTPATIENT
Start: 2024-03-06 | End: 2024-03-06 | Stop reason: SURG

## 2024-03-06 RX ORDER — SODIUM CHLORIDE, SODIUM LACTATE, POTASSIUM CHLORIDE, CALCIUM CHLORIDE 600; 310; 30; 20 MG/100ML; MG/100ML; MG/100ML; MG/100ML
INJECTION, SOLUTION INTRAVENOUS CONTINUOUS
Status: DISCONTINUED | OUTPATIENT
Start: 2024-03-06 | End: 2024-03-07

## 2024-03-06 RX ORDER — MORPHINE SULFATE 4 MG/ML
2 INJECTION, SOLUTION INTRAMUSCULAR; INTRAVENOUS EVERY 10 MIN PRN
Status: DISCONTINUED | OUTPATIENT
Start: 2024-03-06 | End: 2024-03-06 | Stop reason: HOSPADM

## 2024-03-06 RX ORDER — METOCLOPRAMIDE HYDROCHLORIDE 5 MG/ML
10 INJECTION INTRAMUSCULAR; INTRAVENOUS EVERY 8 HOURS PRN
Status: DISCONTINUED | OUTPATIENT
Start: 2024-03-06 | End: 2024-03-07

## 2024-03-06 RX ORDER — MORPHINE SULFATE 10 MG/ML
6 INJECTION, SOLUTION INTRAMUSCULAR; INTRAVENOUS EVERY 10 MIN PRN
Status: DISCONTINUED | OUTPATIENT
Start: 2024-03-06 | End: 2024-03-06 | Stop reason: HOSPADM

## 2024-03-06 RX ORDER — HYDROMORPHONE HYDROCHLORIDE 1 MG/ML
0.4 INJECTION, SOLUTION INTRAMUSCULAR; INTRAVENOUS; SUBCUTANEOUS EVERY 2 HOUR PRN
Status: DISCONTINUED | OUTPATIENT
Start: 2024-03-06 | End: 2024-03-07

## 2024-03-06 RX ORDER — BISACODYL 10 MG
10 SUPPOSITORY, RECTAL RECTAL
Status: DISCONTINUED | OUTPATIENT
Start: 2024-03-06 | End: 2024-03-06

## 2024-03-06 RX ORDER — HYDROMORPHONE HYDROCHLORIDE 1 MG/ML
0.8 INJECTION, SOLUTION INTRAMUSCULAR; INTRAVENOUS; SUBCUTANEOUS EVERY 2 HOUR PRN
Status: DISCONTINUED | OUTPATIENT
Start: 2024-03-06 | End: 2024-03-07

## 2024-03-06 RX ORDER — SENNOSIDES 8.6 MG
17.2 TABLET ORAL NIGHTLY
Status: DISCONTINUED | OUTPATIENT
Start: 2024-03-06 | End: 2024-03-07

## 2024-03-06 RX ORDER — ONDANSETRON 2 MG/ML
4 INJECTION INTRAMUSCULAR; INTRAVENOUS EVERY 6 HOURS PRN
Status: CANCELLED | OUTPATIENT
Start: 2024-03-06

## 2024-03-06 RX ORDER — BISACODYL 10 MG
10 SUPPOSITORY, RECTAL RECTAL
Status: DISCONTINUED | OUTPATIENT
Start: 2024-03-06 | End: 2024-03-07

## 2024-03-06 RX ORDER — SENNOSIDES 8.6 MG
17.2 TABLET ORAL NIGHTLY PRN
Status: DISCONTINUED | OUTPATIENT
Start: 2024-03-06 | End: 2024-03-07

## 2024-03-06 RX ORDER — OXYCODONE HYDROCHLORIDE 5 MG/1
10 TABLET ORAL EVERY 4 HOURS PRN
Status: DISCONTINUED | OUTPATIENT
Start: 2024-03-06 | End: 2024-03-07

## 2024-03-06 RX ORDER — NAPROXEN 500 MG/1
500 TABLET ORAL 2 TIMES DAILY WITH MEALS
Status: DISCONTINUED | OUTPATIENT
Start: 2024-03-06 | End: 2024-03-07

## 2024-03-06 RX ORDER — TRANEXAMIC ACID 650 MG/1
1300 TABLET ORAL ONCE
Status: COMPLETED | OUTPATIENT
Start: 2024-03-06 | End: 2024-03-06

## 2024-03-06 RX ORDER — ACETAMINOPHEN 500 MG
1000 TABLET ORAL ONCE
Status: COMPLETED | OUTPATIENT
Start: 2024-03-06 | End: 2024-03-06

## 2024-03-06 RX ORDER — ACETAMINOPHEN 500 MG
1000 TABLET ORAL EVERY 8 HOURS
Status: DISCONTINUED | OUTPATIENT
Start: 2024-03-06 | End: 2024-03-07

## 2024-03-06 RX ORDER — METOCLOPRAMIDE HYDROCHLORIDE 5 MG/ML
10 INJECTION INTRAMUSCULAR; INTRAVENOUS EVERY 8 HOURS PRN
Status: DISCONTINUED | OUTPATIENT
Start: 2024-03-06 | End: 2024-03-06 | Stop reason: HOSPADM

## 2024-03-06 RX ORDER — ONDANSETRON 2 MG/ML
4 INJECTION INTRAMUSCULAR; INTRAVENOUS EVERY 6 HOURS PRN
Status: DISCONTINUED | OUTPATIENT
Start: 2024-03-06 | End: 2024-03-06

## 2024-03-06 RX ORDER — NALOXONE HYDROCHLORIDE 0.4 MG/ML
80 INJECTION, SOLUTION INTRAMUSCULAR; INTRAVENOUS; SUBCUTANEOUS AS NEEDED
Status: DISCONTINUED | OUTPATIENT
Start: 2024-03-06 | End: 2024-03-06 | Stop reason: HOSPADM

## 2024-03-06 RX ORDER — CEFAZOLIN SODIUM/WATER 2 G/20 ML
SYRINGE (ML) INTRAVENOUS AS NEEDED
Status: DISCONTINUED | OUTPATIENT
Start: 2024-03-06 | End: 2024-03-06 | Stop reason: SURG

## 2024-03-06 RX ORDER — ASPIRIN 81 MG/1
81 TABLET ORAL 2 TIMES DAILY
Status: SHIPPED | COMMUNITY
Start: 2024-03-06

## 2024-03-06 RX ORDER — BUPIVACAINE HYDROCHLORIDE 7.5 MG/ML
INJECTION, SOLUTION INTRASPINAL
Status: COMPLETED | OUTPATIENT
Start: 2024-03-06 | End: 2024-03-06

## 2024-03-06 RX ORDER — ACETAMINOPHEN 500 MG
1000 TABLET ORAL
Status: DISCONTINUED | OUTPATIENT
Start: 2024-03-06 | End: 2024-03-06

## 2024-03-06 RX ORDER — OXYCODONE HYDROCHLORIDE 5 MG/1
5 TABLET ORAL EVERY 6 HOURS PRN
Status: DISCONTINUED | OUTPATIENT
Start: 2024-03-06 | End: 2024-03-06

## 2024-03-06 RX ORDER — DIPHENHYDRAMINE HYDROCHLORIDE 50 MG/ML
25 INJECTION INTRAMUSCULAR; INTRAVENOUS ONCE AS NEEDED
Status: ACTIVE | OUTPATIENT
Start: 2024-03-06 | End: 2024-03-06

## 2024-03-06 RX ORDER — MIDAZOLAM HYDROCHLORIDE 1 MG/ML
INJECTION INTRAMUSCULAR; INTRAVENOUS
Status: COMPLETED | OUTPATIENT
Start: 2024-03-06 | End: 2024-03-06

## 2024-03-06 RX ORDER — POLYETHYLENE GLYCOL 3350 17 G/17G
17 POWDER, FOR SOLUTION ORAL DAILY PRN
Status: DISCONTINUED | OUTPATIENT
Start: 2024-03-06 | End: 2024-03-07

## 2024-03-06 RX ORDER — DIPHENHYDRAMINE HCL 25 MG
25 CAPSULE ORAL EVERY 4 HOURS PRN
Status: DISCONTINUED | OUTPATIENT
Start: 2024-03-06 | End: 2024-03-07

## 2024-03-06 RX ORDER — ROPIVACAINE HYDROCHLORIDE 5 MG/ML
INJECTION, SOLUTION EPIDURAL; INFILTRATION; PERINEURAL
Status: COMPLETED | OUTPATIENT
Start: 2024-03-06 | End: 2024-03-06

## 2024-03-06 RX ORDER — ACETAMINOPHEN 500 MG
1000 TABLET ORAL EVERY 8 HOURS
Status: SHIPPED | COMMUNITY
Start: 2024-03-06

## 2024-03-06 RX ORDER — ONDANSETRON 2 MG/ML
4 INJECTION INTRAMUSCULAR; INTRAVENOUS EVERY 6 HOURS PRN
Status: DISCONTINUED | OUTPATIENT
Start: 2024-03-06 | End: 2024-03-07

## 2024-03-06 RX ORDER — CEFAZOLIN SODIUM/WATER 2 G/20 ML
2 SYRINGE (ML) INTRAVENOUS ONCE
Status: DISCONTINUED | OUTPATIENT
Start: 2024-03-06 | End: 2024-03-06 | Stop reason: HOSPADM

## 2024-03-06 RX ORDER — MORPHINE SULFATE 4 MG/ML
4 INJECTION, SOLUTION INTRAMUSCULAR; INTRAVENOUS EVERY 10 MIN PRN
Status: DISCONTINUED | OUTPATIENT
Start: 2024-03-06 | End: 2024-03-06 | Stop reason: HOSPADM

## 2024-03-06 RX ORDER — DIPHENHYDRAMINE HYDROCHLORIDE 50 MG/ML
12.5 INJECTION INTRAMUSCULAR; INTRAVENOUS EVERY 4 HOURS PRN
Status: DISCONTINUED | OUTPATIENT
Start: 2024-03-06 | End: 2024-03-07

## 2024-03-06 RX ORDER — ENEMA 19; 7 G/133ML; G/133ML
1 ENEMA RECTAL ONCE AS NEEDED
Status: DISCONTINUED | OUTPATIENT
Start: 2024-03-06 | End: 2024-03-07

## 2024-03-06 RX ORDER — ASPIRIN 81 MG/1
81 TABLET ORAL 2 TIMES DAILY
Status: DISCONTINUED | OUTPATIENT
Start: 2024-03-06 | End: 2024-03-06

## 2024-03-06 RX ORDER — OXYCODONE HYDROCHLORIDE 5 MG/1
5 TABLET ORAL EVERY 4 HOURS PRN
Status: DISCONTINUED | OUTPATIENT
Start: 2024-03-06 | End: 2024-03-07

## 2024-03-06 RX ORDER — HYDROMORPHONE HYDROCHLORIDE 1 MG/ML
0.4 INJECTION, SOLUTION INTRAMUSCULAR; INTRAVENOUS; SUBCUTANEOUS EVERY 5 MIN PRN
Status: DISCONTINUED | OUTPATIENT
Start: 2024-03-06 | End: 2024-03-06 | Stop reason: HOSPADM

## 2024-03-06 RX ORDER — ENEMA 19; 7 G/133ML; G/133ML
1 ENEMA RECTAL ONCE AS NEEDED
Status: DISCONTINUED | OUTPATIENT
Start: 2024-03-06 | End: 2024-03-06

## 2024-03-06 RX ORDER — OXYCODONE HYDROCHLORIDE 5 MG/1
5 TABLET ORAL EVERY 6 HOURS PRN
Status: SHIPPED | COMMUNITY
Start: 2024-03-06

## 2024-03-06 RX ORDER — HYDROMORPHONE HYDROCHLORIDE 1 MG/ML
0.2 INJECTION, SOLUTION INTRAMUSCULAR; INTRAVENOUS; SUBCUTANEOUS EVERY 5 MIN PRN
Status: DISCONTINUED | OUTPATIENT
Start: 2024-03-06 | End: 2024-03-06 | Stop reason: HOSPADM

## 2024-03-06 RX ORDER — NAPROXEN 500 MG/1
500 TABLET ORAL 2 TIMES DAILY WITH MEALS
Refills: 0 | Status: SHIPPED | COMMUNITY
Start: 2024-03-06

## 2024-03-06 RX ADMIN — ROPIVACAINE HYDROCHLORIDE 30 ML: 5 INJECTION, SOLUTION EPIDURAL; INFILTRATION; PERINEURAL at 10:53:00

## 2024-03-06 RX ADMIN — DEXAMETHASONE SODIUM PHOSPHATE 10 MG: 10 INJECTION, SOLUTION INTRAMUSCULAR; INTRAVENOUS at 10:53:00

## 2024-03-06 RX ADMIN — MIDAZOLAM HYDROCHLORIDE 2 MG: 1 INJECTION INTRAMUSCULAR; INTRAVENOUS at 10:53:00

## 2024-03-06 RX ADMIN — CEFAZOLIN SODIUM/WATER 2 G: 2 G/20 ML SYRINGE (ML) INTRAVENOUS at 12:00:00

## 2024-03-06 RX ADMIN — LIDOCAINE HYDROCHLORIDE 50 MG: 10 INJECTION, SOLUTION EPIDURAL; INFILTRATION; INTRACAUDAL; PERINEURAL at 11:47:00

## 2024-03-06 RX ADMIN — BUPIVACAINE HYDROCHLORIDE 1 ML: 7.5 INJECTION, SOLUTION INTRASPINAL at 11:46:00

## 2024-03-06 RX ADMIN — SODIUM CHLORIDE, SODIUM LACTATE, POTASSIUM CHLORIDE, CALCIUM CHLORIDE: 600; 310; 30; 20 INJECTION, SOLUTION INTRAVENOUS at 11:43:00

## 2024-03-06 RX ADMIN — SODIUM CHLORIDE, SODIUM LACTATE, POTASSIUM CHLORIDE, CALCIUM CHLORIDE: 600; 310; 30; 20 INJECTION, SOLUTION INTRAVENOUS at 12:55:00

## 2024-03-06 NOTE — ANESTHESIA PROCEDURE NOTES
Spinal Block    Date/Time: 3/6/2024 11:46 AM    Performed by: Elías Echols MD  Authorized by: Elías Echols MD      General Information and Staff    Start Time:  3/6/2024 11:46 AM  End Time:  3/6/2024 11:47 AM  Anesthesiologist:  Elías Echols MD  Performed by:  Anesthesiologist  Patient Location:  OR  Site identification: surface landmarks    Reason for Block: at surgeon's request and surgical anesthesia    Preanesthetic Checklist: patient identified, IV checked, risks and benefits discussed, monitors and equipment checked, pre-op evaluation, timeout performed, anesthesia consent and sterile technique used      Procedure Details    Patient Position:  Sitting  Prep: ChloraPrep    Monitoring:  Cardiac monitor  Approach:  Midline  Location:  L4-5  Injection Technique:  Single-shot    Needle    Needle Type:  Pencil-tip  Needle Gauge:  24 G  Needle Length:  3.5 in    Assessment    Sensory Level:  T10  Events: clear CSF, CSF aspirated, well tolerated and blood negative      Additional Comments

## 2024-03-06 NOTE — OPERATIVE REPORT
[facility]    PATIENT'S NAME: AVERY BOLDEN   ATTENDING PHYSICIAN: Forest Bunch MD   OPERATING PHYSICIAN: Forest Bunch MD   PATIENT ACCOUNT#:   [id number]    LOCATION:  [facility] PACU 1 [facility]  MEDICAL RECORD #:   [id number]       YOB: 1960  ADMISSION DATE:       03/06/2024      OPERATION DATE:  03/06/2024    OPERATIVE REPORT      PREOPERATIVE DIAGNOSIS:  Right knee osteoarthritis.  POSTOPERATIVE DIAGNOSIS:  Right knee osteoarthritis.  PROCEDURE:  Right total knee arthroplasty.      ASSISTANTS:    1.   Sandra Hall PA-C   2.   Laurel Bates RSA     IMPLANTS USED:  Nichol Persona size 6 femur, size D tibia, 26 mm polyethylene patella button, and 12 mm constrained polyethylene insert.     INDICATIONS:  This is a pleasant 63-year-old female who sustained right knee osteoarthritis that was symptomatic.  The patient failed nonoperative intervention, and she desired surgery in the form of right total knee arthroplasty.  I had a long discussion with the patient regarding the risks, benefits, and alternatives of surgical and nonsurgical intervention.  The risks of surgery included, but were not limited to, infection, bleeding, neurovascular injury, pain and stiffness after surgery, need for surgery in the future, development of deep vein thrombosis or pulmonary emboli, and anesthetic compromise.  The patient agreed to proceed with surgical intervention after the above risks were discussed in great detail.    OPERATIVE TECHNIQUE:  After the patient's informed consent was obtained, the patient's right knee was marked in the preoperative holding area.  She was given an adductor canal block by the anesthesia service.  She was given a spinal anesthetic and sedated.  The right lower extremity was prepped and draped in standard surgical fashion.  Perioperative antibiotics were infused.  Confirmation of identity and laterality took place.  Time-out was performed.    Tourniquet was  insufflated to 300 mmHg.  A midline incision was made over the anterior aspect of the right knee.  Medial parapatellar arthrotomy was performed.  Medial release was performed.  The suprapatellar pouch was excised.  The knee was flexed.  The anterior horn of the lateral meniscus was sectioned.  The distal femoral cutting guide was placed.  It was pinned and cut in the appropriate position.  The 4-in-1 cutting guide was placed.  The anterior and posterior cuts were made, followed by the anterior and posterior chamfer cuts.  At this time, the tibia was subluxated forward and pinned and cut in the appropriate position.  Remnant medial and lateral menisci were excised from the wound.  A 10 mm block was then placed and was stable in flexion and extension.  The proximal tibia was sized to a size D.  The trial femoral component was placed.  The box was cut.  The size D tibia and a 12 mm polyethylene insert were placed.  The patient had full extension, full flexion, and was stable to varus and valgus stress testing.  At this time, the patella was everted and pinned and cut to accept a 26 mm implant.  The proximal tibia was then cut to accept the final implant.  The wound was thoroughly irrigated.  The pain cocktail was placed in the posteromedial and lateral capsule in order to aid in postoperative pain control.  The cement was mixed.  The implants were placed.  The proximal tibial implant was placed first, followed by the distal femoral component.  A 12 mm trial polyethylene insert was placed.  The patella was placed.  The knee was brought out into full extension.  Cement was allowed to cure.  Once cement was allowed to cure, a 12 mm polyethylene constrained implant was placed and found to be stable.  Therefore, excess cement was taken out of the wound, the wound was thoroughly irrigated once more, and the final 12 mm constrained polyethylene implant was placed.  The wound was irrigated once more and then closed in layers.   Medial parapatellar arthrotomy was closed with #1 Vicryl.  Subcutaneous tissue was closed with 2-0 Vicryl suture.  Skin was closed with staples.  Dressing was applied to the anterior aspect of the right knee.  The patient was awakened from anesthesia and transferred to the recovery room in stable condition.    DISPOSITION:  The patient will go home today.  She will perform home physical therapy for 2 weeks, followed by outpatient physical therapy for 4 weeks.  She will take OxyContin and Tylenol for pain control, as well as aspirin for DVT prophylaxis.  She will call our office with any questions or concerns.      Please note that Ms. Hall was essential to the case for preoperative patient positioning, draping, assistance with soft tissue retraction, closure of the wound, and placement of the dressing.    Dictated By Forest Bunch MD  d: 03/06/2024 13:16:49  t: 03/06/2024 13:36:56  Job [id number]  GD/

## 2024-03-06 NOTE — PHYSICAL THERAPY NOTE
PHYSICAL THERAPY KNEE EVALUATION - INPATIENT      Room Number: [facility]  Evaluation Date: 3/6/2024  Type of Evaluation: Initial  Physician Order: PT Eval and Treat    Presenting Problem: s/p R TKA on 3/6     Reason for Therapy: Mobility Dysfunction and Discharge Planning    PHYSICAL THERAPY ASSESSMENT   Patient is a 63 year old female admitted 3/6/2024 for R TKA.  Prior to admission, patient's baseline is independent with ADLs and functional mobility without assistive device.  Patient is currently functioning below baseline with bed mobility, transfers, gait, stair negotiation, standing prolonged periods, and performing household tasks.  Patient is requiring stand-by assist and contact guard assist as a result of the following impairments: decreased functional strength, pain, impaired standing balance, decreased muscular endurance, limited R knee ROM, and impaired sensation .  Physical Therapy will continue to follow for duration of hospitalization.    Patient will benefit from continued skilled PT Services at discharge to promote functional independence and safety with additional support and return home with home health PT.    PLAN  PT Treatment Plan: Bed mobility;Body mechanics;Endurance;Energy conservation;Patient education;Family education;Gait training;Strengthening;Stair training;Transfer training;Balance training  Rehab Potential : Good  Frequency (Obs): BID    PHYSICAL THERAPY MEDICAL/SOCIAL HISTORY     Problem List  Active Problems:    Primary osteoarthritis of right knee    Preop testing      HOME SITUATION  Home Situation  Type of Home: House  Home Layout: Two level (lives downstairs)  Stairs to Enter : 5  Railing: Yes  Lives With: Alone (family lives upstairs)  Drives: Yes  Patient Owned Equipment: Rolling walker  Patient Regularly Uses: Glasses     Prior Level of [place]: independent     SUBJECTIVE  Agreeable to activity.    PHYSICAL THERAPY EXAMINATION   OBJECTIVE  Precautions: None  Fall  Risk: Standard fall risk    WEIGHT BEARING RESTRICTION  Weight Bearing Restriction: R lower extremity  R Lower Extremity: Weight Bearing as Tolerated    PAIN ASSESSMENT  Rating:  (did not rate)  Location: right knee  Management Techniques: Body mechanics;Activity promotion;Repositioning    COGNITION  Overall Cognitive Status:  WFL - within functional limits    RANGE OF MOTION AND STRENGTH ASSESSMENT  Upper extremity ROM and strength are within functional limits.  Lower extremity ROM is within functional limits.  Lower extremity strength is within functional limits.    BALANCE  Static Sitting: Good  Dynamic Sitting: Fair +  Static Standing: Fair  Dynamic Standing: Fair -    AM-PAC '6-Clicks' INPATIENT SHORT FORM - BASIC MOBILITY  How much difficulty does the patient currently have...  Patient Difficulty: Turning over in bed (including adjusting bedclothes, sheets and blankets)?: A Little   Patient Difficulty: Sitting down on and standing up from a chair with arms (e.g., wheelchair, bedside commode, etc.): A Little   Patient Difficulty: Moving from lying on back to sitting on the side of the bed?: A Little   How much help from another person does the patient currently need...   Help from Another: Moving to and from a bed to a chair (including a wheelchair)?: A Little   Help from Another: Need to walk in hospital room?: A Little   Help from Another: Climbing 3-5 steps with a railing?: A Little     AM-PAC Score:  Raw Score: 18   Approx Degree of Impairment: 46.58%   Standardized Score (AM-PAC Scale): 43.63   CMS Modifier (G-Code): CK     FUNCTIONAL ABILITY STATUS  Functional Mobility/Gait Assessment  Gait Assistance: Contact guard assist  Distance (ft): 200  Assistive Device: Rolling walker  Pattern: Shuffle;R Decreased stance time (R decreased weight bearing, flexed posture, guarded, heavy BUE reliance on RW, step-to pattern, slow and frequent pauses while walking due to fatigue and pain. no LOB)  Supine to Sit:  stand-by assist  Sit to Supine: stand-by assist  Sit to Stand: contact guard assist    Exercise/Education Provided:  Bed mobility  Body mechanics  Energy conservation  Functional activity tolerated  Gait training  Posture  Lower therapeutic exercise:  Ankle pumps  Heel slides  LAQ  Quad sets  Sitting knee flexion  Transfer training    Pt received resting in bed. Introduced self and role. Educated on WBAT, TKA mobility protocol and exercises, role of PT, goals for this session. Pt verbalized understanding. C/o minimal pain of R knee at rest, worse with movement. Main complaint this date is impaired sensation and numbness in R foot. Pt demos bed mobility with SBA. STS transfer with RW and CGA. Able to weight shift laterally and march in place with no knee buckling noted. Able to take side steps along EOB with RW, no LOB. Therefore attempted hallway ambulation. Pt was able to ambulate community distance with RW and CGA, however with difficulty bearing weight and advancing RLE, significant BUE reliance on RW to do so. No LOB but unsteady and unsafe to progress gait to SBA at this time. Pt demos toilet transfer with CGA. Approached the stairs to attempt, however unable to do so due to numbness and pain. Returned to room, left pt sitting at EOB with family present, needs within reach. Discussed recommendation to stay overnight for additional safey and therapy session tomorrow morning prior to DC. Pt agreeable. RN was made aware of the above.     The patient's Approx Degree of Impairment: 46.58% has been calculated based on documentation in the [facility] '6 clicks' Inpatient Basic Mobility Short Form.  Research supports that patients with this level of impairment may benefit from home with HH PT.  Final disposition will be made by interdisciplinary medical team.    Patient End of Session: In bed;Needs met;Call light within reach;RN aware of session/findings;All patient questions and concerns addressed;Family present    CURRENT  GOALS  Goals to be met by: 3/10/24  Patient Goal Patient's self-stated goal is: go home   Goal #1 Patient is able to demonstrate supine - sit EOB @ level: modified independent     Goal #1   Current Status    Goal #2 Patient is able to demonstrate transfers Sit to/from Stand at assistance level: modified independent     Goal #2  Current Status    Goal #3 Patient is able to ambulate 150 feet with assistive device at assistance level: supervision   Goal #3   Current Status    Goal #4 Patient will negotiate 4 stairs/one curb w/ assistive device and supervision   Goal #4   Current Status    Goal #5  AROM 0 degrees extension to 95 degrees flexion     Goal #5   Current Status    Goal #6 Patient independently performs home exercise program for ROM/strengthening per the instructions provided in preparation for discharge.   Goal #6  Current Status      Patient Evaluation Complexity Level:  History Low - no personal factors and/or co-morbidities   Examination of body systems Low -  addressing 1-2 elements   Clinical Presentation Low- Stable   Clinical Decision Making  Low Complexity     Gait Training: 10 minutes  Therapeutic Activity:  15 minutes

## 2024-03-06 NOTE — ANESTHESIA PREPROCEDURE EVALUATION
Anesthesia PreOp Note    HPI:     Monse Ro is a 63 year old female who presents for preoperative consultation requested by: Forest Bunch MD    Date of Surgery: 3/6/2024    Procedure(s):  Right total knee arthroplasty  Indication: Primary osteoarthritis of right knee    Relevant Problems   No relevant active problems       NPO:  Last Liquid Consumption Date: 24  Last Liquid Consumption Time:   Last Solid Consumption Date: 24  Last Solid Consumption Time:   Last Liquid Consumption Date: 24          History Review:  Patient Active Problem List    Diagnosis Date Noted    Osteoarthrosis, unspecified whether generalized or localized, lower leg 2015       Past Medical History:   Diagnosis Date    Disorder of thyroid     Glaucoma     Visual impairment     glasses       Past Surgical History:   Procedure Laterality Date    ARTHROSCOPY OF JOINT UNLISTED Bilateral           EYE SURGERY Left     HYSTERECTOMY      TOTAL KNEE REPLACEMENT Left        Medications Prior to Admission   Medication Sig Dispense Refill Last Dose    mupirocin 2% Nasal Ointment 0.9 g (1 Application total) by Each Nare route 2 (two) times daily. 10 doses for 5 days   3/5/2024 at 2100    dorzolamide-timolol 2-0.5 % Ophthalmic Solution Place 1 drop into both eyes 2 (two) times daily.   3/5/2024 at 1900    levothyroxine 75 MCG Oral Tab Take 1 tablet (75 mcg total) by mouth before breakfast.   3/6/2024 at 0700     Current Facility-Administered Medications Ordered in Epic   Medication Dose Route Frequency Provider Last Rate Last Admin    lactated ringers infusion   Intravenous Continuous Forest Bunch MD 20 mL/hr at 24 0956 New Bag at 24 0956    ceFAZolin (Ancef) 2 g in 20mL IV syringe premix  2 g Intravenous Once Forest Bunch MD        And    vancomycin (Vancocin) 1,000 mg in sodium chloride 0.9% 250 mL IVPB-ADDV  15 mg/kg Intravenous Once Forest Bunch  mL/hr at  24 1007 1,000 mg at 24 1007    clonidine-EPINEPHrine-ropivacaine (CERs) (Duraclon-Adrenalin-Naropin) pain cocktain irrigation (for NSAId allergic patients)   Intra-articular Once (Intra-Op) Forest Bunch MD         No current [facility]-ordered outpatient medications on file.       Allergies   Allergen Reactions    Penicillin V Tightness in Chest    Penicillins DIZZINESS       Family History   Problem Relation Age of Onset    Diabetes Paternal Grandmother     Diabetes Sister     Diabetes Father      Social History     Socioeconomic History    Marital status:    Tobacco Use    Smoking status: Former     Types: Cigarettes     Quit date: 2010     Years since quittin.4    Smokeless tobacco: Never   Vaping Use    Vaping Use: Never used   Substance and Sexual Activity    Alcohol use: Not Currently    Drug use: No   Other Topics Concern    Caffeine Concern Yes     Comment: coffee       Available pre-op labs reviewed.             Vital Signs:  Body mass index is 24.56 kg/m².   height is 1.549 m (5' 1\") and weight is 59 kg (130 lb). Her oral temperature is 97.8 °F (36.6 °C). Her blood pressure is 146/76 and her pulse is 85. Her respiration is 16 and oxygen saturation is 100%.   Vitals:    24 1704 24 0935   BP:  146/76   Pulse:  85   Resp:  16   Temp:  97.8 °F (36.6 °C)   TempSrc:  Oral   SpO2:  100%   Weight: 59 kg (130 lb) 59 kg (130 lb)   Height: 1.549 m (5' 1\") 1.549 m (5' 1\")        Anesthesia Evaluation     Patient summary reviewed and Nursing notes reviewed    No history of anesthetic complications   Airway   Mallampati: II  TM distance: >3 FB  Neck ROM: full  Dental      Pulmonary - negative ROS    breath sounds clear to auscultation  (-) COPD, asthma, sleep apnea  Cardiovascular - negative ROS  Exercise tolerance: good  (-) hypertension, CAD, CHF    Rhythm: regular  Rate: normal    Neuro/Psych - negative ROS   (-) CVA    GI/Hepatic/Renal - negative ROS   (-) GERD, liver  disease, renal disease    Endo/Other    (+) hypothyroidism, arthritis  (-) diabetes mellitus    Comments: H/o L TKA under spinal  Abdominal                  Anesthesia Plan:   ASA:  2  Plan:   Spinal  Post-op Pain Management: Oral pain medication, IV analgesics and Saphenous block  Informed Consent Plan and Risks Discussed With:  Patient  Consent Comment: Risks of spinal including infection, hematoma, nerve damage and risks of GA including N/V, sore throat, respiratory/cardiac compromise explained to pt and pt voiced understanding; all questions answered.          I have informed Monse Ro and/or legal guardian or family member of the nature of the anesthetic plan, benefits, risks including possible dental damage if relevant, major complications, and any alternative forms of anesthetic management.   All of the patient's questions were answered to the best of my ability. The patient desires the anesthetic management as planned.  Elías Echols MD  3/6/2024 10:25 AM  Present on Admission:  **None**

## 2024-03-06 NOTE — H&P
History and Physical     Monse Ro Patient Status:  Hospital Outpatient Surgery    10/24/1960 MRN [id number]   Location [facility] POST ANESTHESIA CARE UNIT Attending Forest Bunch MD   Hosp Day # 0 PCP AMNA SIMPSON     Chief Complaint: right knee primary osteoarthritis    Subjective:    Monse Ro is a 63 year old female with right knee OA. Pt has failed non-op tx and is indicated to undergo R TKA.     History/Other:      Past Medical History:  Past Medical History:   Diagnosis Date    Disorder of thyroid     Glaucoma     Visual impairment     glasses        Past Surgical History:   Past Surgical History:   Procedure Laterality Date    ARTHROSCOPY OF JOINT UNLISTED Bilateral           EYE SURGERY Left     HYSTERECTOMY      TOTAL KNEE REPLACEMENT Left        Social History:  reports that she quit smoking about 13 years ago. Her smoking use included cigarettes. She has never used smokeless tobacco. She reports that she does not currently use alcohol. She reports that she does not use drugs.    Family History:   Family History   Problem Relation Age of Onset    Diabetes Paternal Grandmother     Diabetes Sister     Diabetes Father        Allergies:   Allergies   Allergen Reactions    Penicillin V Tightness in Chest    Penicillins DIZZINESS       Medications:    No current facility-administered medications on file prior to encounter.     Current Outpatient Medications on File Prior to Encounter   Medication Sig Dispense Refill    mupirocin 2% Nasal Ointment 0.9 g (1 Application total) by Each Nare route 2 (two) times daily. 10 doses for 5 days      dorzolamide-timolol 2-0.5 % Ophthalmic Solution Place 1 drop into both eyes 2 (two) times daily.      levothyroxine 75 MCG Oral Tab Take 1 tablet (75 mcg total) by mouth before breakfast.         Review of Systems:   A comprehensive 14 point review of systems was completed.    Pertinent positives and negatives noted in the  HPI.    Objective:     /74   Pulse 73   Temp 97.8 °F (36.6 °C) (Oral)   Resp 17   Ht 5' 1\" (1.549 m)   Wt 130 lb (59 kg)   SpO2 96%   BMI 24.56 kg/m²   General: No acute distress.  Alert ,         HEENT: Normocephalic atraumatic. Moist mucous membranes. EOM-I. PERRLA. Anicteric.  Neck: No lymphadenopathy. No JVD. No carotid bruits.  Respiratory: Clear to auscultation bilaterally. No wheezes. No rhonchi.    Cardiovascular: S1, S2. Regular rate and rhythm. No murmurs, rubs or gallops. Equal pulses.   Chest and Back: No tenderness or deformity.  Abdomen: Soft, nontender, nondistended.  Positive bowel sounds. No rebound, guarding or organomegaly.  Neurologic: No focal neurological deficits. CNII-XII grossly intact.  Musculoskeletal: Moves all extremities.  Extremities: No edema or cyanosis.  Psychiatric: Appropriate mood and affect.  Integument: No rashes or lesions.   R LE:  TTP over MJL, LJL  -Woo's  Stable to varus and valgus stress        Assessment & Plan:    63F that sustained right knee OA.  To OR for R TKA   -Home today  -ASA for DVT prophylaxis  Tylenol/Oxy for pain      The risks, benefits, and alternatives of surgical versus non-surgical intervention were discussed in detail and include but are not limited to infection, bleeding, neurovascular injury, need for further surgery, development of deep vein thrombosis, pulmonary emboli, and anesthesia problems. The patient understands the risks and wishes to proceed with surgery.           Forest Bunch MD  3/6/2024

## 2024-03-06 NOTE — ANESTHESIA PROCEDURE NOTES
Peripheral Block    Date/Time: 3/6/2024 10:53 AM    Performed by: Elías Echols MD  Authorized by: Elías Echols MD      General Information and Staff    Start Time:  3/6/2024 10:53 AM  End Time:  3/6/2024 10:56 AM  Anesthesiologist:  Elías Echols MD  Performed by:  Anesthesiologist  Patient Location:  PACU    Block Placement: Pre Induction  Site Identification: real time ultrasound guided and image stored and retrievable      Reason for Block: at surgeon's request and post-op pain management    Preanesthetic Checklist: 2 patient identifers, IV checked, risks and benefits discussed, monitors and equipment checked, pre-op evaluation, timeout performed, anesthesia consent and sterile technique used      Procedure Details    Patient Position:  Supine  Prep: ChloraPrep    Monitoring:  Cardiac monitor  Block Type:  Saphenous  Laterality:  Right  Injection Technique:  Single-shot    Needle    Needle Type:  Short-bevel  Needle Gauge:  22 G  Needle Length:  100 mm  Needle Localization:  Ultrasound guidance  Reason for Ultrasound Use: appropriate spread of the medication was noted in real time and no ultrasound evidence of intravascular and/or intraneural injection            Assessment    Injection Assessment:  Good spread noted, incremental injection, low pressure, local visualized surrounding nerve on ultrasound, negative aspiration for heme and no pain on injection  Paresthesia Pain:  None  Heart Rate Change: No        Medications  3/6/2024 10:53 AM  midazolam (VERSED)injection 2mg/2ml - Intravenous   2 mg - 3/6/2024 10:53:00 AM  ropivacaine (NAROPIN) injection 0.5% - Infiltration   30 mL - 3/6/2024 10:53:00 AM  dexamethasone (DECADRON) PF injection 10 mg/ml - Injection   10 mg - 3/6/2024 10:53:00 AM    Additional Comments

## 2024-03-06 NOTE — ANESTHESIA POSTPROCEDURE EVALUATION
Patient: Monse Ro    Procedure Summary       Date: 03/06/24 Room / Location: [facility] MAIN OR 02 / [facility] MAIN OR    Anesthesia Start: 1143 Anesthesia Stop: 1333    Procedure: Right total knee arthroplasty (Right: Knee) Diagnosis: (Primary osteoarthritis of right knee)    Surgeons: Forest Bunch MD Anesthesiologist: Elías Echols MD    Anesthesia Type: spinal ASA Status: 2            Anesthesia Type: spinal    Vitals Value Taken Time   /87 03/06/24 1411   Temp 97.8 °F (36.6 °C) 03/06/24 1330   Pulse 84 03/06/24 1414   Resp 13 03/06/24 1414   SpO2 99 % 03/06/24 1414   Vitals shown include unfiled device data.    [facility] AN Post Evaluation:   Patient Evaluated in PACU  Patient Participation: waiting for patient participation  Level of Consciousness: sleepy but conscious  Pain Score: 0  Pain Management: adequate  Airway Patency:patent  Yes    Cardiovascular Status: acceptable and hemodynamically stable  Respiratory Status: acceptable, spontaneous ventilation and nonlabored ventilation  Postoperative Hydration euvolemic      Elías Echols MD  3/6/2024 2:14 PM

## 2024-03-07 VITALS
BODY MASS INDEX: 24.55 KG/M2 | DIASTOLIC BLOOD PRESSURE: 65 MMHG | RESPIRATION RATE: 18 BRPM | TEMPERATURE: 98 F | OXYGEN SATURATION: 99 % | HEART RATE: 96 BPM | SYSTOLIC BLOOD PRESSURE: 133 MMHG | WEIGHT: 130 LBS | HEIGHT: 61 IN

## 2024-03-07 LAB
HCT VFR BLD AUTO: 36.4 %
HGB BLD-MCNC: 11.7 G/DL

## 2024-03-07 PROCEDURE — 97535 SELF CARE MNGMENT TRAINING: CPT

## 2024-03-07 PROCEDURE — 85014 HEMATOCRIT: CPT | Performed by: PHYSICIAN ASSISTANT

## 2024-03-07 PROCEDURE — 97116 GAIT TRAINING THERAPY: CPT

## 2024-03-07 PROCEDURE — 97165 OT EVAL LOW COMPLEX 30 MIN: CPT

## 2024-03-07 PROCEDURE — 85018 HEMOGLOBIN: CPT | Performed by: PHYSICIAN ASSISTANT

## 2024-03-07 NOTE — PROGRESS NOTES
[facility] Patient Status:  Outpatient in a Bed    10/24/1960 MRN [id number]   Location [facility] Attending Forest Bunch MD   Hosp Day # 0 PCP AMNA SIMPSON     Subjective:  Post-Operative Day: 1 Status Post right Total Knee Arthroplasty  Systemic or Specific Complaints:No Complaints      Objective:  Vital signs in last 24 hours:  Temp:  [97.7 °F (36.5 °C)-98.2 °F (36.8 °C)] 97.7 °F (36.5 °C)  Pulse:  [] 96  Resp:  [11-25] 18  BP: (106-154)/(65-89) 133/65  SpO2:  [92 %-100 %] 99 %    General: alert, appears stated age, and cooperative   Wound: Wound clean and dry no evidence of infection.   Motion: Extension: Full Extension   DVT Exam: No evidence of DVT seen on physical exam.     Data Review  CBC:   Lab Results   Component Value Date    HGB 11.7 (L) 2024    HCT 36.4 2024       Assessment/Plan:  Status Post right Total Knee Arthroplasty. Doing well postoperatively.     Discharge today, Return to Clinic: 2 weeks  Home PT  Asa for DVT prophylaxis  Tylenol/Oxy for pain control     LOS: 0 days

## 2024-03-07 NOTE — H&P
[facility] Hospitalist H&P       Post operative medical management     PCP: AMNA SIMPSON    History of Present Illness:   63-year-old female who is here for right total knee arthroplasty.  Procedure was elective and scheduled for today.  Procedure went well however afterwards patient did not fully do well with physical therapy and plan is for more physical therapy tomorrow.  Plan is for patient to stay overnight.  She denies any other complaints and is currently doing okay.  Vital signs are stable.    Review of Systems  Comprehensive ROS reviewed and negative except for what's stated above.     PMH  Past Medical History:   Diagnosis Date    Disorder of thyroid     Glaucoma     Visual impairment     glasses      PSH  Past Surgical History:   Procedure Laterality Date    ARTHROSCOPY OF JOINT UNLISTED Bilateral           EYE SURGERY Left     HYSTERECTOMY      TOTAL KNEE REPLACEMENT Left       ALL:  Allergies   Allergen Reactions    Penicillin V Tightness in Chest    Penicillins DIZZINESS    Celebrex [Celecoxib] RASH      Home Medications:  Outpatient Medications Marked as Taking for the 3/6/24 encounter (Hospital Encounter)   Medication Sig Dispense Refill    mupirocin 2% Nasal Ointment 0.9 g (1 Application total) by Each Nare route 2 (two) times daily. 10 doses for 5 days      oxyCODONE 5 MG Oral Tab Take 1 tablet (5 mg total) by mouth every 6 (six) hours as needed for Pain.      acetaminophen 500 MG Oral Tab Take 2 tablets (1,000 mg total) by mouth every 8 (eight) hours.      aspirin 81 MG Oral Tab EC Take 1 tablet (81 mg total) by mouth in the morning and 1 tablet (81 mg total) before bedtime.      naproxen 500 MG Oral Tab Take 1 tablet (500 mg total) by mouth 2 (two) times daily with meals.  0    dorzolamide-timolol 2-0.5 % Ophthalmic Solution Place 1 drop into both eyes 2 (two) times daily.      levothyroxine 75 MCG Oral Tab Take 1 tablet (75 mcg total) by mouth before breakfast.        Soc Hx  Social History     Tobacco Use    Smoking status: Former     Types: Cigarettes     Quit date: 2010     Years since quittin.4    Smokeless tobacco: Never   Substance Use Topics    Alcohol use: Not Currently      Fam Hx  Family History   Problem Relation Age of Onset    Diabetes Paternal Grandmother     Diabetes Sister     Diabetes Father      OBJECTIVE:  /85 (BP Location: Right arm)   Pulse 88   Temp 98.2 °F (36.8 °C) (Temporal)   Resp 16   Ht 5' 1\" (1.549 m)   Wt 130 lb (59 kg)   SpO2 98%   BMI 24.56 kg/m²   General: Alert, no acute distress  HEENT: atraumatic, sclera anicteric, oral mucosa normal   Neck: non tender, no adenopathy   Lungs: clear to ausculation bilaterally, no wheezing  Heart: Regular rate and rhythm  Abdomen: soft, non tender, non distended   Extremities: No edema  Skin: no new rash, normal color  Neuro: CN II-XII intact, 5/5 strength in bilateral extremities, normal sensation in face and extremities  Psych: appropriate affect     Radiology: XR KNEE (1 OR 2 VIEWS), RIGHT (CPT=73560)    Result Date: 3/6/2024  CONCLUSION:  1. Total right knee arthroplasty.    Dictated by (CST): José Luis Cline MD on 3/06/2024 at 3:35 PM     Finalized by (CST): José Luis Cline MD on 3/06/2024 at 3:37 PM           ASSESSMENT / PLAN:   63-year-old female who is here for right total knee arthroplasty.     Right total knee arthroplasty  -will remain in hospital and be observed overnight  -repeat therapy tomorrow  -ordered her aspirin 81mg BID  -tylenol + oxycodone  -ok to hold her home meds, she states she'll take her synthroid at home  -incentive spirometer if able  -no need for AM labs     Hypothyroidism  -can hold her synthroid     Disposition: home tomorrow     Arjun Houston [facility] Hospitalist

## 2024-03-07 NOTE — OCCUPATIONAL THERAPY NOTE
OCCUPATIONAL THERAPY EVALUATION - INPATIENT     Room Number: Room 7/Room 7-A  Evaluation Date: 3/7/2024  Type of Evaluation: Initial  Presenting Problem: RT TKA    Physician Order: IP Consult to Occupational Therapy  Reason for Therapy: ADL/IADL Dysfunction and Discharge Planning    OCCUPATIONAL THERAPY ASSESSMENT   Patient is a 63 year old female admitted 3/6/2024 for RT TKA.  Prior to admission, patient's baseline is indepenednt without AD.  Patient is currently functioning near baseline with ADLs and functional mobility. Further skilled OT services are not indicated. Pt plans to return home this morning with support from her family as needed.     PLAN  Patient has been evaluated and presents with no skilled Occupational Therapy needs  at this time.  Patient will be discharged from Occupational Therapy services. Please re-order if a new functional limitation presents during this admission.     OCCUPATIONAL THERAPY MEDICAL/SOCIAL HISTORY   Problem List  Active Problems:    Primary osteoarthritis of right knee    Preop testing    HOME SITUATION  Type of Home: House  Home Layout: Multi-level (Pt resides in lower level with 6 stairs to access)  Lives With: -- (pt's mother and 2 sisters reside in the same home on the upper levels)  Toilet and Equipment: Standard height toilet  Shower/Tub and Equipment: Walk-in shower  Occupation/Status: manager  Drives: Yes  Patient Regularly Uses: Glasses    Stairs in Home: 6 to access her living space  Use of Assistive Device(s): none    Prior Level of [place]: I without AD for all I/ADLs and functional mobility including driving and working.     SUBJECTIVE  \"I was shocked when I couldn't go home last night\"    OCCUPATIONAL THERAPY EXAMINATION    OBJECTIVE  Precautions: None  Fall Risk: Standard fall risk    WEIGHT BEARING RESTRICTION  R Lower Extremity: Weight Bearing as Tolerated    PAIN ASSESSMENT  Rating: -- (minimal)  Location: RT knee  Management Techniques: Relaxation;  Repositioning    ACTIVITIES OF DAILY LIVING ASSESSMENT  -PAC ‘6-Clicks’ Inpatient Daily Activity Short Form  How much help from another person does the patient currently need…  -   Putting on and taking off regular lower body clothing?: None  -   Bathing (including washing, rinsing, drying)?: None  -   Toileting, which includes using toilet, bedpan or urinal? : None  -   Putting on and taking off regular upper body clothing?: None  -   Taking care of personal grooming such as brushing teeth?: None  -   Eating meals?: None    AM-PAC Score:  Score: 24  Approx Degree of Impairment: 0%  Standardized Score (-PAC Scale): 57.54  CMS Modifier (G-Code): CH    FUNCTIONAL TRANSFER ASSESSMENT  Sit to Stand: Edge of Bed; Chair  Edge of Bed: Modified Independent  Chair: Modified Independent  Toilet Transfer: Modified Independent  Simulated Car Transfer: Supervision  BR mobility: supervision/Mod I using R/W    FUNCTIONAL ADL ASSESSMENT  Pt demo the ability to perform BADLs including LE dressing, toileting, standing for grooming modified independent.     EDUCATION PROVIDED  Patient: Role of Occupational Therapy; Plan of Care; Discharge Recommendations; DME Recommendations; Functional Transfer Techniques; Weight Bear Status; Surgical Precautions; Posture/Positioning; Proper Body Mechanics  Patient's Response to Education: Verbalized Understanding; Returned Demonstration    The patient's Approx Degree of Impairment: 0% has been calculated based on documentation in the [facility] '6 clicks' Inpatient Daily Activity Short Form.  Research supports that patients with this level of impairment have no further skilled OT needs..  Final disposition will be made by interdisciplinary medical team.    Patient End of Session: Up in chair;Call light within reach;Needs met;All patient questions and concerns addressed    Patient Evaluation Complexity Level:   Occupational Profile/Medical History LOW - Brief history including review of medical or  therapy records    Specific performance deficits impacting engagement in ADL/IADL LOW  1 - 3 performance deficits    Client Assessment/Performance Deficits LOW - No comorbidities nor modifications of tasks    Clinical Decision Making LOW - Analysis of occupational profile, problem-focused assessments, limited treatment options    Overall Complexity LOW     Self-Care Home Management: 15 minutes

## 2024-03-07 NOTE — DISCHARGE SUMMARY
General Medicine Discharge Summary     Patient ID:  Monse Ro  63 year old  10/24/1960    Admit date: 3/6/2024    Discharge date and time: 3/7/2024 10:41 AM     Attending Physician: Arjun Lugo DO    Primary Care Physician: AMNA SIMPSON     Discharge Diagnoses:   Primary osteoarthritis of right knee    Discharge Condition: stable    Disposition: home     Important Follow up:  Forest Bunch MD  [address] [address]  [phone]    Schedule an appointment as soon as possible for a visit in 1 day(s)    Hospital Course:      63-year-old female who is here for right total knee arthroplasty.      Right total knee arthroplasty  -remained in hospital and observed overnight  -aspirin 81mg BID  -tylenol + oxycodone PRN at discharge. Also some nsaids  -stable to dc to home today, with appropriate orthopedic surgery follow up     Consults:   IP CONSULT TO CASE MANAGEMENT  IP CONSULT TO SOCIAL WORK    Operative Procedures:   Procedure(s) (LRB):  Right total knee arthroplasty (Right)       Patient instructions:      Discharge Medication List as of 3/7/2024  9:36 AM        CONTINUE these medications which have NOT CHANGED    Details   oxyCODONE 5 MG Oral Tab Take 1 tablet (5 mg total) by mouth every 6 (six) hours as needed for Pain., Historical      acetaminophen 500 MG Oral Tab Take 2 tablets (1,000 mg total) by mouth every 8 (eight) hours., Historical      aspirin 81 MG Oral Tab EC Take 1 tablet (81 mg total) by mouth in the morning and 1 tablet (81 mg total) before bedtime., Historical      naproxen 500 MG Oral Tab Take 1 tablet (500 mg total) by mouth 2 (two) times daily with meals., Historical, R-0      dorzolamide-timolol 2-0.5 % Ophthalmic Solution Place 1 drop into both eyes 2 (two) times daily., Historical      levothyroxine 75 MCG Oral Tab Take 1 tablet (75 mcg total) by mouth before breakfast., Historical           STOP  taking these medications       mupirocin 2% Nasal Ointment            FULL CODE    Exam on day of discharge:     Vitals:    03/07/24 0332   BP: 133/65   Pulse:    Resp: 18   Temp: 97.7 °F (36.5 °C)       General: no acute distress  Heart: RRR  Lungs: clear bilaterally  Abdomen: nontender  Extremities: no pedal edema     Total time coordinating care for discharge: Greater than 30 minutes    Arjun Lugo DO

## 2024-03-07 NOTE — PHYSICAL THERAPY NOTE
PHYSICAL THERAPY KNEE TREATMENT NOTE - INPATIENT     Room Number: Room 7/Room 7-A             Presenting Problem: s/p R TKA on 3/6       Problem List  Active Problems:    Primary osteoarthritis of right knee    Preop testing      PHYSICAL THERAPY ASSESSMENT   Patient demonstrates good  progress this session, goals  remain in progress.    Patient continues to function below baseline with bed mobility, transfers, gait, and standing prolonged periods.  Contributing factors to remaining limitations include decreased functional strength, pain, impaired standing balance, and decreased muscular endurance.  Next session anticipate patient to progress transfers, gait, and standing prolonged periods.  Physical Therapy will continue to follow patient for duration of hospitalization.    Patient continues to benefit from continued skilled PT services: at discharge to promote functional independence in home.  Anticipate patient will return home with home health PT.    PLAN  PT Treatment Plan: Bed mobility;Endurance;Patient education;Gait training;Strengthening;Stair training;Transfer training;Balance training  Frequency (Obs): Daily    SUBJECTIVE  I want to get out of here.    OBJECTIVE  Precautions: None    WEIGHT BEARING STATUS        R Lower Extremity: Weight Bearing as Tolerated       PAIN ASSESSMENT   Rating:  (did not rate, stated, \"it itches\")  Location: R knee  Management Techniques: Activity promotion;Repositioning    BALANCE  Static Sitting: Good  Dynamic Sitting: Good  Static Standing: Fair  Dynamic Standing: Fair -    ACTIVITY TOLERANCE                         O2 WALK       AM-PAC '6-Clicks' INPATIENT SHORT FORM - BASIC MOBILITY  How much difficulty does the patient currently have...  Patient Difficulty: Turning over in bed (including adjusting bedclothes, sheets and blankets)?: A Little   Patient Difficulty: Sitting down on and standing up from a chair with arms (e.g., wheelchair, bedside commode, etc.): A Little    Patient Difficulty: Moving from lying on back to sitting on the side of the bed?: A Little   How much help from another person does the patient currently need...   Help from Another: Moving to and from a bed to a chair (including a wheelchair)?: A Little   Help from Another: Need to walk in hospital room?: A Little   Help from Another: Climbing 3-5 steps with a railing?: A Little     AM-PAC Score:  Raw Score: 18   Approx Degree of Impairment: 46.58%   Standardized Score (AM-PAC Scale): 43.63   CMS Modifier (G-Code): CK    FUNCTIONAL ABILITY STATUS  Functional Mobility/Gait Assessment  Gait Assistance: Supervision  Distance (ft): 100, 50  Assistive Device: Rolling walker  Pattern: R Decreased stance time  Stairs: Stairs  How Many Stairs: 4  Device: 1 Rail  Assist: Contact guard assist  Pattern: Ascend and Descend  Ascend and Descend : Step to  Rolling:  NT  Supine to Sit:  NT  Sit to Supine:  NT  Sit to Stand: supervision and stand-by assist    Additional Information: Pt ok to be seen per YANA Saleem. Pt highly motivated to return home today. Pt demo'd safety with transfers and gait. Pt limited by altered sensation on surgical LE, both to light touch and deep pressure when compared with LLE. Pt is not completely numb, but her sensation is not typical. Pt is more confident in her ability to perform functional mobility today and her pain in her RLE is significantly improved since yesterday. Pt educ on stair pattern, able to perform 4 stairs up/down with CGA progressing to standby assist. Pt educ in RW placement and hand placement to initiate and complete stairs. Pt declined review of therex and stated, \"I've done them all before.\" Pt has handout with pictures and instructions of therex in her bag for home. Discussed activity pacing for home with pt. Pt safe to perform mobility required at home.    The patient's Approx Degree of Impairment: 46.58% has been calculated based on documentation in the [facility] '6 clicks'  Inpatient Basic Mobility Short Form.  Research supports that patients with this level of impairment may benefit from home with HHPT.  Final disposition will be made by interdisciplinary medical team.        Knee ROM   R Knee Flexion (degrees): 85 (approx)             Patient End of Session: Up in chair;Needs met;Call light within reach;RN aware of session/findings;All patient questions and concerns addressed    CURRENT GOALS  Goals to be met by: 3/10/24  Patient Goal Patient's self-stated goal is: go home   Goal #1 Patient is able to demonstrate supine - sit EOB @ level: modified independent      Goal #1   Current Status  NT pt received up in chair   Goal #2 Patient is able to demonstrate transfers Sit to/from Stand at assistance level: modified independent      Goal #2  Current Status  supervision with RW for safety   Goal #3 Patient is able to ambulate 150 feet with assistive device at assistance level: supervision   Goal #3   Current Status  x100 and x50 with RW and standby to supv.   Goal #4 Patient will negotiate 4 stairs/one curb w/ assistive device and supervision   Goal #4   Current Status  up/down stairs with 1 rail and CGA to supv   Goal #5  AROM 0 degrees extension to 95 degrees flexion     Goal #5   Current Status  in progress   Goal #6 Patient independently performs home exercise program for ROM/strengthening per the instructions provided in preparation for discharge.   Goal #6  Current Status  in progress     Gait Training: 15 minutes

## (undated) DEVICE — ENCORE® LATEX MICRO SIZE 8.5, STERILE LATEX POWDER-FREE SURGICAL GLOVE: Brand: ENCORE

## (undated) DEVICE — BOWL CEMENT MIX QUICK-VAC

## (undated) DEVICE — Device: Brand: STABLECUT®

## (undated) DEVICE — PAD THRP 16IN WRPON MU LNG STM

## (undated) DEVICE — PACK CDS TOTAL KNEE

## (undated) DEVICE — ENCORE® LATEX ACCLAIM SIZE 8.5, STERILE LATEX POWDER-FREE SURGICAL GLOVE: Brand: ENCORE

## (undated) DEVICE — NEEDLE HPO 18GA 1.5IN ECLPS

## (undated) DEVICE — GAMMEX® PI HYBRID SIZE 8.5, STERILE POWDER-FREE SURGICAL GLOVE, POLYISOPRENE AND NEOPRENE BLEND: Brand: GAMMEX

## (undated) DEVICE — PSNA CM FM/CM TB/CPS VE SUR/ST: Type: IMPLANTABLE DEVICE

## (undated) DEVICE — DRAPE SHEET LARGE 76X55

## (undated) DEVICE — PIN DRL L75MM DIA3.2MM HEX 2.5MM TRCR TIP

## (undated) DEVICE — DISPOSABLE TOURNIQUET CUFF SINGLE BLADDER, DUAL PORT AND QUICK CONNECT CONNECTOR: Brand: COLOR CUFF

## (undated) DEVICE — HOOD, PEEL-AWAY: Brand: FLYTE

## (undated) DEVICE — SOLUTION  .9 1000ML BTL

## (undated) DEVICE — 25MM FEMALE SCREW-Z

## (undated) DEVICE — DRESSING SURG W9XL25CM SIL SP CVR WTRPRF VIR

## (undated) DEVICE — HOOD: Brand: FLYTE

## (undated) DEVICE — GOWN SURG AERO BLUE PERF LG

## (undated) DEVICE — Device

## (undated) DEVICE — PAD PERINL PST FOAM DISP FOR AMSCO SURG TBL

## (undated) DEVICE — 60 ML SYRINGE LUER-LOCK TIP: Brand: MONOJECT

## (undated) DEVICE — SUT VICRYL 1-0 CTX J977H

## (undated) DEVICE — SUT VCRL 1-0 36IN CTX ABSRB UD L48MM 1/2 CIR

## (undated) DEVICE — SHEET,DRAPE,53X77,STERILE: Brand: MEDLINE

## (undated) DEVICE — 3M™ IOBAN™ 2 ANTIMICROBIAL INCISE DRAPE 6650EZ: Brand: IOBAN™ 2

## (undated) DEVICE — PROXIMATE SKIN STAPLERS (35 WIDE) CONTAINS 35 STAINLESS STEEL STAPLES (FIXED HEAD): Brand: PROXIMATE

## (undated) DEVICE — SCREW ORTH 2.5X25MM FEM HEX PERSONA

## (undated) DEVICE — 48MM HEADED SCREW-Z

## (undated) DEVICE — BOWL BNE CEM MIX DISP QUIK-VAC

## (undated) DEVICE — NEEDLE HYPO 18GA L1.5IN PNK HUB PVT SHLD BVL

## (undated) DEVICE — WRAP COOLING KNEE W/ICE PILLOW

## (undated) DEVICE — APPLICATOR CHLORAPREP 26ML

## (undated) DEVICE — TROCAR-Z

## (undated) DEVICE — SUT FIBERWIRE 2 C-13 AR-7202

## (undated) DEVICE — APPLICATOR SKIN PREP 26ML HI LT ORNG 2% CHG

## (undated) DEVICE — SCREW FIX 3.5X48MM HEX HD

## (undated) DEVICE — PADDING 4YDX6IN CTTN STRL WBRL

## (undated) DEVICE — SOLUTION IRRIG 1000ML 0.9% NACL USP BTL

## (undated) DEVICE — PSI PERSONA PS PIN GDE FEM-TIB

## (undated) DEVICE — DRESSING AQUACEL AG 3.5 X 10

## (undated) DEVICE — SOLUTION IRRIG 3000ML 0.9% NACL FLX CONT

## (undated) DEVICE — SUT VICRYL 2-0 FS-1 J443H

## (undated) DEVICE — SUT VCRL 2-0 27IN FS-1 ABSRB UD L24MM 3/8 CIR

## (undated) DEVICE — TOTAL KNEE: Brand: MEDLINE INDUSTRIES, INC.

## (undated) DEVICE — SOLUTION  .9 3000ML

## (undated) NOTE — MR AVS SNAPSHOT
[place]  [address]  [phone]               Thank you for choosing us for your health care visit with Fredo Simons MD.  We are glad to serve you and happy to provide you with this harvey Enter your [employer] Activation Code exactly as it appears below along with your Zip Code and Date of Birth to complete the sign-up process. If you do not sign up before the expiration date, you must request a new code.     Your unique [employer] Access Code: [email or web address]